# Patient Record
Sex: FEMALE | Race: WHITE | Employment: UNEMPLOYED | URBAN - METROPOLITAN AREA
[De-identification: names, ages, dates, MRNs, and addresses within clinical notes are randomized per-mention and may not be internally consistent; named-entity substitution may affect disease eponyms.]

---

## 2021-11-22 ENCOUNTER — APPOINTMENT (OUTPATIENT)
Dept: CT IMAGING | Age: 86
DRG: 543 | End: 2021-11-22
Payer: MEDICARE

## 2021-11-22 ENCOUNTER — APPOINTMENT (OUTPATIENT)
Dept: GENERAL RADIOLOGY | Age: 86
DRG: 543 | End: 2021-11-22
Payer: MEDICARE

## 2021-11-22 ENCOUNTER — HOSPITAL ENCOUNTER (INPATIENT)
Age: 86
LOS: 4 days | Discharge: NURSING, REHAB OR LONG-TERM CARE | DRG: 543 | End: 2021-11-26
Attending: EMERGENCY MEDICINE | Admitting: INTERNAL MEDICINE
Payer: MEDICARE

## 2021-11-22 DIAGNOSIS — T14.8XXA HEMATOMA: ICD-10-CM

## 2021-11-22 DIAGNOSIS — S32.591A CLOSED FRACTURE OF RAMUS OF RIGHT PUBIS, INITIAL ENCOUNTER (HCC): ICD-10-CM
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
## 2021-11-22 DIAGNOSIS — S32.591A: Primary | ICD-10-CM

## 2021-11-22 DIAGNOSIS — S09.90XA CLOSED HEAD INJURY, INITIAL ENCOUNTER: ICD-10-CM
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
## 2021-11-22 LAB
ANION GAP SERPL CALCULATED.3IONS-SCNC: 11 MMOL/L (ref 3–16)
BASOPHILS ABSOLUTE: 0.1 K/UL (ref 0–0.2)
BASOPHILS RELATIVE PERCENT: 0.6 %
BUN BLDV-MCNC: 29 MG/DL (ref 7–20)
CALCIUM SERPL-MCNC: 9 MG/DL (ref 8.3–10.6)
CHLORIDE BLD-SCNC: 103 MMOL/L (ref 99–110)
CO2: 24 MMOL/L (ref 21–32)
CREAT SERPL-MCNC: 1 MG/DL (ref 0.6–1.2)
EOSINOPHILS ABSOLUTE: 0.1 K/UL (ref 0–0.6)
EOSINOPHILS RELATIVE PERCENT: 0.6 %
GFR AFRICAN AMERICAN: >60
GFR NON-AFRICAN AMERICAN: 51
GLUCOSE BLD-MCNC: 118 MG/DL (ref 70–99)
HCT VFR BLD CALC: 36.3 % (ref 36–48)
HEMOGLOBIN: 11.8 G/DL (ref 12–16)
LYMPHOCYTES ABSOLUTE: 0.8 K/UL (ref 1–5.1)
LYMPHOCYTES RELATIVE PERCENT: 6.4 %
MCH RBC QN AUTO: 31.5 PG (ref 26–34)
MCHC RBC AUTO-ENTMCNC: 32.5 G/DL (ref 31–36)
MCV RBC AUTO: 96.9 FL (ref 80–100)
MONOCYTES ABSOLUTE: 0.7 K/UL (ref 0–1.3)
MONOCYTES RELATIVE PERCENT: 5.5 %
NEUTROPHILS ABSOLUTE: 11.6 K/UL (ref 1.7–7.7)
NEUTROPHILS RELATIVE PERCENT: 86.9 %
PDW BLD-RTO: 13.8 % (ref 12.4–15.4)
PLATELET # BLD: 204 K/UL (ref 135–450)
PMV BLD AUTO: 7.6 FL (ref 5–10.5)
POTASSIUM REFLEX MAGNESIUM: 3.9 MMOL/L (ref 3.5–5.1)
RBC # BLD: 3.74 M/UL (ref 4–5.2)
SARS-COV-2, NAAT: NOT DETECTED
SODIUM BLD-SCNC: 138 MMOL/L (ref 136–145)
WBC # BLD: 13.3 K/UL (ref 4–11)

## 2021-11-22 PROCEDURE — 87635 SARS-COV-2 COVID-19 AMP PRB: CPT

## 2021-11-22 PROCEDURE — 85025 COMPLETE CBC W/AUTO DIFF WBC: CPT

## 2021-11-22 PROCEDURE — 2060000000 HC ICU INTERMEDIATE R&B

## 2021-11-22 PROCEDURE — 70450 CT HEAD/BRAIN W/O DYE: CPT

## 2021-11-22 PROCEDURE — 1200000000 HC SEMI PRIVATE

## 2021-11-22 PROCEDURE — 99283 EMERGENCY DEPT VISIT LOW MDM: CPT

## 2021-11-22 PROCEDURE — 72125 CT NECK SPINE W/O DYE: CPT

## 2021-11-22 PROCEDURE — 72170 X-RAY EXAM OF PELVIS: CPT

## 2021-11-22 PROCEDURE — 80048 BASIC METABOLIC PNL TOTAL CA: CPT

## 2021-11-22 RX ORDER — HYDROCODONE BITARTRATE AND ACETAMINOPHEN 5; 325 MG/1; MG/1
1 TABLET ORAL EVERY 6 HOURS PRN
Status: DISCONTINUED | OUTPATIENT
Start: 2021-11-22 | End: 2021-11-26 | Stop reason: HOSPADM

## 2021-11-22 RX ORDER — SODIUM CHLORIDE 0.9 % (FLUSH) 0.9 %
5-40 SYRINGE (ML) INJECTION EVERY 12 HOURS SCHEDULED
Status: DISCONTINUED | OUTPATIENT
Start: 2021-11-22 | End: 2021-11-26 | Stop reason: HOSPADM

## 2021-11-22 RX ORDER — AMLODIPINE BESYLATE AND BENAZEPRIL HYDROCHLORIDE 5; 40 MG/1; MG/1
1 CAPSULE ORAL DAILY
Status: ON HOLD | COMMUNITY
Start: 2021-09-03 | End: 2021-11-25 | Stop reason: HOSPADM

## 2021-11-22 RX ORDER — SODIUM CHLORIDE 0.9 % (FLUSH) 0.9 %
5-40 SYRINGE (ML) INJECTION PRN
Status: DISCONTINUED | OUTPATIENT
Start: 2021-11-22 | End: 2021-11-26 | Stop reason: HOSPADM

## 2021-11-22 RX ORDER — ACETAMINOPHEN 325 MG/1
650 TABLET ORAL EVERY 6 HOURS PRN
Status: DISCONTINUED | OUTPATIENT
Start: 2021-11-22 | End: 2021-11-26 | Stop reason: HOSPADM

## 2021-11-22 RX ORDER — POLYETHYLENE GLYCOL 3350 17 G/17G
17 POWDER, FOR SOLUTION ORAL DAILY PRN
Status: DISCONTINUED | OUTPATIENT
Start: 2021-11-22 | End: 2021-11-26 | Stop reason: HOSPADM

## 2021-11-22 RX ORDER — ONDANSETRON 2 MG/ML
4 INJECTION INTRAMUSCULAR; INTRAVENOUS EVERY 6 HOURS PRN
Status: DISCONTINUED | OUTPATIENT
Start: 2021-11-22 | End: 2021-11-26 | Stop reason: HOSPADM

## 2021-11-22 RX ORDER — METOPROLOL SUCCINATE 100 MG/1
100 TABLET, EXTENDED RELEASE ORAL DAILY
COMMUNITY
Start: 2021-09-03

## 2021-11-22 RX ORDER — ACETAMINOPHEN 650 MG/1
650 SUPPOSITORY RECTAL EVERY 6 HOURS PRN
Status: DISCONTINUED | OUTPATIENT
Start: 2021-11-22 | End: 2021-11-26 | Stop reason: HOSPADM

## 2021-11-22 RX ORDER — ONDANSETRON 4 MG/1
4 TABLET, ORALLY DISINTEGRATING ORAL EVERY 8 HOURS PRN
Status: DISCONTINUED | OUTPATIENT
Start: 2021-11-22 | End: 2021-11-26 | Stop reason: HOSPADM

## 2021-11-22 RX ORDER — SODIUM CHLORIDE 9 MG/ML
25 INJECTION, SOLUTION INTRAVENOUS PRN
Status: DISCONTINUED | OUTPATIENT
Start: 2021-11-22 | End: 2021-11-26 | Stop reason: HOSPADM

## 2021-11-22 ASSESSMENT — ENCOUNTER SYMPTOMS
ABDOMINAL PAIN: 0
RHINORRHEA: 0
COLOR CHANGE: 0
SHORTNESS OF BREATH: 0
SORE THROAT: 0

## 2021-11-22 ASSESSMENT — PAIN SCALES - WONG BAKER
WONGBAKER_NUMERICALRESPONSE: 2
WONGBAKER_NUMERICALRESPONSE: 2

## 2021-11-22 NOTE — ED PROVIDER NOTES
ROS, all other systems were reviewed and negative. PAST MEDICAL HISTORY   History reviewed. No pertinent past medical history. SURGICAL HISTORY     History reviewed. No pertinent surgical history. CURRENT MEDICATIONS       Previous Medications    AMLODIPINE-BENAZEPRIL (LOTREL) 5-40 MG PER CAPSULE    Take 1 capsule by mouth daily    METOPROLOL SUCCINATE (TOPROL XL) 100 MG EXTENDED RELEASE TABLET    Take 100 mg by mouth daily         ALLERGIES     Patient has no known allergies. FAMILY HISTORY     History reviewed. No pertinent family history. SOCIAL HISTORY       Social History     Socioeconomic History    Marital status:      Spouse name: None    Number of children: None    Years of education: None    Highest education level: None   Occupational History    None   Tobacco Use    Smoking status: None    Smokeless tobacco: None   Substance and Sexual Activity    Alcohol use: None    Drug use: None    Sexual activity: None   Other Topics Concern    None   Social History Narrative    None     Social Determinants of Health     Financial Resource Strain:     Difficulty of Paying Living Expenses: Not on file   Food Insecurity:     Worried About Running Out of Food in the Last Year: Not on file    Lan of Food in the Last Year: Not on file   Transportation Needs:     Lack of Transportation (Medical): Not on file    Lack of Transportation (Non-Medical):  Not on file   Physical Activity:     Days of Exercise per Week: Not on file    Minutes of Exercise per Session: Not on file   Stress:     Feeling of Stress : Not on file   Social Connections:     Frequency of Communication with Friends and Family: Not on file    Frequency of Social Gatherings with Friends and Family: Not on file    Attends Confucianism Services: Not on file    Active Member of Clubs or Organizations: Not on file    Attends Club or Organization Meetings: Not on file    Marital Status: Not on file Intimate Partner Violence:     Fear of Current or Ex-Partner: Not on file    Emotionally Abused: Not on file    Physically Abused: Not on file    Sexually Abused: Not on file   Housing Stability:     Unable to Pay for Housing in the Last Year: Not on file    Number of [place] in the Last Year: Not on file    Unstable Housing in the Last Year: Not on file       SCREENINGS             PHYSICAL EXAM  (up to 7 for level 4, 8 or more for level 5)     ED Triage Vitals [11/22/21 1339]   BP Temp Temp Source Pulse Resp SpO2 Height Weight   (!) 146/55 97.6 °F (36.4 °C) Oral 86 15 96 % 5' 7\" (1.702 m) --       Physical Exam  Constitutional:       Appearance: She is well-developed. HENT:      Head: Normocephalic. Right Ear: Tympanic membrane normal.      Left Ear: Tympanic membrane normal.   Cardiovascular:      Rate and Rhythm: Normal rate. Pulmonary:      Effort: Pulmonary effort is normal. No respiratory distress. Abdominal:      General: There is no distension. Palpations: Abdomen is soft. Tenderness: There is no abdominal tenderness. Musculoskeletal:         General: Normal range of motion. Cervical back: Normal range of motion. No spinous process tenderness or muscular tenderness. Skin:     General: Skin is warm and dry. Neurological:      Mental Status: She is alert and oriented to person, place, and time.          DIAGNOSTIC RESULTS   LABS:    Labs Reviewed   CBC WITH AUTO DIFFERENTIAL - Abnormal; Notable for the following components:       Result Value    WBC 13.3 (*)     RBC 3.74 (*)     Hemoglobin 11.8 (*)     Neutrophils Absolute 11.6 (*)     Lymphocytes Absolute 0.8 (*)     All other components within normal limits    Narrative:     Performed at:  [facility] ([facility]) - [facility] [address],  [place], [facility]   Phone ([phone], RAPID   BASIC METABOLIC PANEL W/ REFLEX TO MG FOR LOW K       All other labs werewithin normal range or not returned as of this dictation. EKG: All EKG's are interpreted by the Emergency Department Physician who either signs or Co-signs this chart in the absence of a cardiologist.  Please see their note for interpretation of EKG. RADIOLOGY:     CT of the head interpreted by radiologist for   Impression:    No acute intracranial abnormality. Mild generalized cerebral atrophy and mild chronic small vessel white matter   ischemic changes. CT of the cervical spine interpreted by radiologist for   Impression:    No acute traumatic injury of the cervical spine. Multilevel disc and facet degenerative changes. Pelvic x-ray interpreted by radiologist for  Impression:    Head mildly displaced fracture through the right pubic symphysis and right   inferior pubic ramus.  No definite hip fracture. Interpretation per the Radiologist below, if available at the time of this note:    CT CERVICAL SPINE WO CONTRAST   Preliminary Result   No acute traumatic injury of the cervical spine. Multilevel disc and facet degenerative changes. CT HEAD WO CONTRAST   Final Result   No acute intracranial abnormality. Mild generalized cerebral atrophy and mild chronic small vessel white matter   ischemic changes. XR PELVIS (1-2 VIEWS)   Final Result   Head mildly displaced fracture through the right pubic symphysis and right   inferior pubic ramus. No definite hip fracture. No results found.       PROCEDURES   Unless otherwise noted below, none     Procedures     CRITICAL CARE TIME   N/A    CONSULTS:  IP CONSULT TO ORTHOPEDIC SURGERY  IP CONSULT TO HOSPITALIST  IP CONSULT TO CASE MANAGEMENT  IP CONSULT TO ORTHOPEDIC SURGERY      EMERGENCYDEPARTMENT COURSE and DIFFERENTIAL DIAGNOSIS/MDM:   Vitals:    Vitals:    11/22/21 1339   BP: (!) 146/55   Pulse: 86   Resp: 15   Temp: 97.6 °F (36.4 °C)   TempSrc: Oral   SpO2: 96%   Height: 5' 7\" (1.702 m)       Patient was given the following medications:  Medications - No data to display  Patient was seen and evaluated by myself. Patient here today for mechanical fall. Daughter reports that they just got back from breakfast when the patient lost her balance. Daughter states that she fell striking the right side of her head. There was no loss of consciousness and she does not use any blood thinners. Patient denies any dizziness or lightheadedness. On exam she is awake and alert hemodynamically stable nontoxic in appearance. She does have large hematoma noted to her right forehead. Patient denies any neck chest abdomen or extremity pain. Daughter reports that she was complaining of her right hip pain however at this is not reproducible at this time. Head CT cervical spine CT in hip x-rays were interpreted. Pelvic x-ray was concerning for mildly displaced fracture through the right pubic symphysis and the right inferior pubic ramus. Consult was placed to the orthopedist.  Eleonora Perez states the patient can be discharged from an orthopedic standpoint however the patient was having difficulty standing up and ambulating in the ED therefore should be admitted to medicine. Consult was placed to the hospitalist for admission. Hospitalist is accepted. Patient's care is transferred to the inpatient unit. The patient tolerated their visit well. I have evaluated this patient. My supervising physician was available for consultation. The patient and / or the family were informed of the results of any tests, a time was given to answer questions, a plan was proposed and they agreed with plan. FINAL IMPRESSION      1. Closed fracture dislocation of right side of symphysis pubis, initial encounter ([facility] [address]    2. Fall, initial encounter    3. Closed head injury, initial encounter    4. Closed fracture of ramus of right pubis, initial encounter ([address]    5.  Hematoma          DISPOSITION/PLAN   DISPOSITION Admitted 11/22/2021 04:18:55 PM      PATIENT REFERRED TO:  No follow-up provider specified.     DISCHARGE MEDICATIONS:  New Prescriptions    No medications on file       DISCONTINUED MEDICATIONS:  Discontinued Medications    No medications on file              (Please note that portions of this note were completed with a voice recognition program.  Efforts were made to edit the dictations but occasionally words are mis-transcribed.)    JANET Foss CNP (electronically signed)         JANET Foss CNP  11/22/21 3651

## 2021-11-22 NOTE — ED PROVIDER NOTES
ED Attending Attestation Note    This patient was seen by the advanced practice provider. I have seen and examined the patient. I agree with the workup, evaluation, management, and diagnosis. The care plan has been discussed. 80 y.o. female presents for evaluation after a fall. Stumbled over her feet. Struck head. Able to stand thereafter, but unable to ambulate. Focused exam:   Gen: 80 y.o. female, NAD  HEENT: Contusion right temple. PERRL. EOMI. CV: RRR w/ +murmur  Lungs: CTAB. No incr WOB. Abdomen: Soft, nontender, nondistended. No rebound/guarding. MSK: Right pelvis stable but tender to anterior compression    CT CERVICAL SPINE WO CONTRAST   Preliminary Result   No acute traumatic injury of the cervical spine. Multilevel disc and facet degenerative changes. CT HEAD WO CONTRAST   Final Result   No acute intracranial abnormality. Mild generalized cerebral atrophy and mild chronic small vessel white matter   ischemic changes. XR PELVIS (1-2 VIEWS)   Final Result   Head mildly displaced fracture through the right pubic symphysis and right   inferior pubic ramus. No definite hip fracture. For further details of the patient's emergency department visit, please see the NITA's documentation. Leonardo Linda MD     This report has been produced using speech recognition software and may contain errors related to that system including errors in grammar, punctuation, and spelling, as well as words and phrases that may be inappropriate. If there are any questions or concerns please feel free to contact the dictating provider for clarification.        Leonardo Linda MD  11/22/21 4903

## 2021-11-23 ENCOUNTER — APPOINTMENT (OUTPATIENT)
Dept: CT IMAGING | Age: 86
DRG: 543 | End: 2021-11-23
Payer: MEDICARE

## 2021-11-23 LAB
ANION GAP SERPL CALCULATED.3IONS-SCNC: 14 MMOL/L (ref 3–16)
BASOPHILS ABSOLUTE: 0 K/UL (ref 0–0.2)
BASOPHILS RELATIVE PERCENT: 0.3 %
BUN BLDV-MCNC: 21 MG/DL (ref 7–20)
CALCIUM SERPL-MCNC: 9.1 MG/DL (ref 8.3–10.6)
CHLORIDE BLD-SCNC: 99 MMOL/L (ref 99–110)
CO2: 20 MMOL/L (ref 21–32)
CREAT SERPL-MCNC: 0.7 MG/DL (ref 0.6–1.2)
EOSINOPHILS ABSOLUTE: 0 K/UL (ref 0–0.6)
EOSINOPHILS RELATIVE PERCENT: 0.1 %
GFR AFRICAN AMERICAN: >60
GFR NON-AFRICAN AMERICAN: >60
GLUCOSE BLD-MCNC: 132 MG/DL (ref 70–99)
GLUCOSE BLD-MCNC: 134 MG/DL (ref 70–99)
HCT VFR BLD CALC: 36.9 % (ref 36–48)
HEMOGLOBIN: 12 G/DL (ref 12–16)
LYMPHOCYTES ABSOLUTE: 1.1 K/UL (ref 1–5.1)
LYMPHOCYTES RELATIVE PERCENT: 10 %
MAGNESIUM: 2.2 MG/DL (ref 1.8–2.4)
MCH RBC QN AUTO: 31.9 PG (ref 26–34)
MCHC RBC AUTO-ENTMCNC: 32.6 G/DL (ref 31–36)
MCV RBC AUTO: 97.7 FL (ref 80–100)
MONOCYTES ABSOLUTE: 0.7 K/UL (ref 0–1.3)
MONOCYTES RELATIVE PERCENT: 6.6 %
NEUTROPHILS ABSOLUTE: 8.7 K/UL (ref 1.7–7.7)
NEUTROPHILS RELATIVE PERCENT: 83 %
PDW BLD-RTO: 13.6 % (ref 12.4–15.4)
PERFORMED ON: ABNORMAL
PLATELET # BLD: 181 K/UL (ref 135–450)
PMV BLD AUTO: 8.9 FL (ref 5–10.5)
POTASSIUM REFLEX MAGNESIUM: 3.5 MMOL/L (ref 3.5–5.1)
RBC # BLD: 3.78 M/UL (ref 4–5.2)
SODIUM BLD-SCNC: 133 MMOL/L (ref 136–145)
WBC # BLD: 10.5 K/UL (ref 4–11)

## 2021-11-23 PROCEDURE — 6370000000 HC RX 637 (ALT 250 FOR IP): Performed by: PHYSICIAN ASSISTANT

## 2021-11-23 PROCEDURE — 99221 1ST HOSP IP/OBS SF/LOW 40: CPT | Performed by: PHYSICIAN ASSISTANT

## 2021-11-23 PROCEDURE — 83735 ASSAY OF MAGNESIUM: CPT

## 2021-11-23 PROCEDURE — 97530 THERAPEUTIC ACTIVITIES: CPT

## 2021-11-23 PROCEDURE — 99222 1ST HOSP IP/OBS MODERATE 55: CPT | Performed by: PHYSICIAN ASSISTANT

## 2021-11-23 PROCEDURE — 97161 PT EVAL LOW COMPLEX 20 MIN: CPT

## 2021-11-23 PROCEDURE — 80048 BASIC METABOLIC PNL TOTAL CA: CPT

## 2021-11-23 PROCEDURE — 85025 COMPLETE CBC W/AUTO DIFF WBC: CPT

## 2021-11-23 PROCEDURE — 97112 NEUROMUSCULAR REEDUCATION: CPT

## 2021-11-23 PROCEDURE — 72192 CT PELVIS W/O DYE: CPT

## 2021-11-23 PROCEDURE — 97165 OT EVAL LOW COMPLEX 30 MIN: CPT

## 2021-11-23 PROCEDURE — 2580000003 HC RX 258: Performed by: NURSE PRACTITIONER

## 2021-11-23 PROCEDURE — 1200000000 HC SEMI PRIVATE

## 2021-11-23 PROCEDURE — 36415 COLL VENOUS BLD VENIPUNCTURE: CPT

## 2021-11-23 PROCEDURE — 6360000002 HC RX W HCPCS: Performed by: NURSE PRACTITIONER

## 2021-11-23 RX ORDER — LISINOPRIL 5 MG/1
5 TABLET ORAL DAILY
Status: DISCONTINUED | OUTPATIENT
Start: 2021-11-23 | End: 2021-11-24

## 2021-11-23 RX ORDER — METOPROLOL SUCCINATE 50 MG/1
50 TABLET, EXTENDED RELEASE ORAL DAILY
Status: DISCONTINUED | OUTPATIENT
Start: 2021-11-23 | End: 2021-11-24

## 2021-11-23 RX ADMIN — SODIUM CHLORIDE, PRESERVATIVE FREE 10 ML: 5 INJECTION INTRAVENOUS at 20:11

## 2021-11-23 RX ADMIN — METOPROLOL SUCCINATE 50 MG: 50 TABLET, EXTENDED RELEASE ORAL at 13:59

## 2021-11-23 RX ADMIN — SODIUM CHLORIDE, PRESERVATIVE FREE 10 ML: 5 INJECTION INTRAVENOUS at 08:53

## 2021-11-23 RX ADMIN — ENOXAPARIN SODIUM 40 MG: 100 INJECTION SUBCUTANEOUS at 00:02

## 2021-11-23 RX ADMIN — LISINOPRIL 5 MG: 5 TABLET ORAL at 13:59

## 2021-11-23 ASSESSMENT — PAIN SCALES - WONG BAKER
WONGBAKER_NUMERICALRESPONSE: 2

## 2021-11-23 NOTE — PLAN OF CARE
Admit 2W    Fall at home  Pelvis X-ray noted with displaced fx through the right pubic symphysis and right inferior pubic ramus  Ortho consulted in ED, ok for weight bearing as tolerated at PT/OT consult  Pt might need SNF  Pain control    Home medication not verified at time of admit, nursing to verify    Mirtha Fan, APRN - CNP

## 2021-11-23 NOTE — FLOWSHEET NOTE
11/23/21 0155   Vital Signs   Temp 97.8 °F (36.6 °C)   Temp Source Oral   Pulse 84   Heart Rate Source Monitor   Resp 18   BP (!) 157/72   BP Location Left upper arm   Patient Position Semi fowlers   Level of Consciousness Alert (0)   MEWS Score 1   Patient Currently in Pain Denies   Oxygen Therapy   SpO2 93 %   Pulse Oximeter Device Mode Intermittent   Pulse Oximeter Device Location Finger   O2 Device None (Room air)     Pt resting in bed with eyes closed. Pt alert and calm. Denies pain at this time. Snack and drink refused. Bed locked in lowest position. Bed alarm in place. Call light and bedside table within reach. Will continue to monitor.

## 2021-11-23 NOTE — PROGRESS NOTES
Spoke with pts daughter regarding pts condition and health history. Daughter would like update when patient is moved to an inpatient unit.

## 2021-11-23 NOTE — PROGRESS NOTES
Patient admitted to room Bed 6 Overflow from ER. Patient oriented to room, call light, bed rails, phone, lights and bathroom. Patient instructed about the schedule of the day including: vital sign frequency, lab draws, possible tests, frequency of MD and staff rounds, daily weights, I &O's and prescribed diet. Yes, bed alarm in place, patient aware of placement and reason. No, Telemetry box in place, patient aware of placement and reason. Bed locked, in lowest position, side rails up 2/4, call light within reach. Recliner Assessment  Patient is not able to demonstrated the ability to move from a reclining position to an upright position within the recliner. Patient is confused, demented and /or unable to follow instruction. 4 Eyes Skin Assessment     The patient is being assess for   Admission    I agree that 2 RN's have performed a thorough Head to Toe Skin Assessment on the patient. ALL assessment sites listed below have been assessed. Areas assessed for pressure by both nurses:   [x]   Head, Face, and Ears   [x]   Shoulders, Back, and Chest, Abdomen  [x]   Arms, Elbows, and Hands   [x]   Coccyx, Sacrum, and Ischium  [x]   Legs, Feet, and Heels    Large hematoma to right forehead, Bruising to right hip, scattered bruising. Skin Assessed Under all Medical Devices by both nurses:  Room Air              All Mepilex Borders were peeled back and area peeked at by both nurses:  No: NA  Please list where Mepilex Borders are located:               **SHARE this note so that the co-signing nurse is able to place an eSignature**    Co-signer eSignature: Electronically signed by Cherrie Huffman RN on 11/23/21 at 12:41 AM EST    Does the Patient have Skin Breakdown related to pressure?   No              Phuc Prevention initiated:  Yes   Wound Care Orders initiated:  NA      [facility] nurse consulted for Pressure Injury (Stage 3,4, Unstageable, DTI, NWPT, Complex wounds)and New or Established Ostomies:  NA Primary Nurse eSignature: Electronically signed by Aleida Russell RN on 11/22/21 at 11:39 PM EST

## 2021-11-23 NOTE — CARE COORDINATION
Case Management Assessment  Initial Evaluation      Patient Name: Thad Win  YOB: 1925  Diagnosis: Hematoma [T14. 8XXA]  Closed head injury, initial encounter [D03.19AH]  Fall, initial encounter [W19. XXXA]  Fall at home, initial encounter [T70. XXXA, L42.078]  Closed fracture of ramus of right pubis, initial encounter ([facility] [address] [S32.591A]  Closed fracture dislocation of right side of symphysis pubis, initial encounter ([facility] [address] Pola Hoff  Date / Time: 11/22/2021  1:08 PM    Admission status/Date: IP 11/22/2021  Chart Reviewed: Yes      Patient Interviewed: No   Family Interviewed:  Yes - daughterMarina       Hospitalization in the last 30 days:  No      Health Care Decision Maker :   Primary Decision Maker: Lalo Gonzalez [facility] - [phone]    (CM - must 1st enter selection under Navigator - emergency contact- [place] Relationship and pick relationship)   Who do you trust or have selected to make healthcare decisions for you    Current PCP: Henry Pavon, [address] [place], [place] [address]Al[place]a Tiline required for SNF : NO         3 night stay required - WAIVED    ADLS  Support Systems/Care Needs:    Transportation: family    Meal Preparation: family    Housing  Living Arrangements: Lives in [facility] w/ dtr  Steps: 8 steps into the house but none once pt is in she can remain on the main floor with access to bed and bath and living area.   Intent for return to present living arrangements: Yes  Identified Issues: NA    Home Care Information  Active with [address] : No Agency:(Services)     Passport/Waiver : No  :                      Phone Number:    Passport/Waiver Services: NA          Durable Medical Equiptment   DME Provider: [facility]  Equipment:   Walker___Cane___RTS___ BSC___Shower Chair___Hospital Bed___W/C____Other________  02 at ____Liter(s)---wears(frequency)_______ [facility] ___ CPAP___ BiPap___   N/A____      Home O2 Use :  No      Community Service Affiliation  Dialysis:  No    · Agency:  · Location:  · Dialysis Schedule:  · Phone:   · Fax: Other Community Services: (ex:PT/OT,Mental Health,Wound Clinic, Cardio/Pul [address])    DISCHARGE PLAN: Explained Case Management role/services. Chart reviewed. Met with pt's daughter, caregiver via phone and explained the role of the CM. Plan: TBD. SNF and [facility] leists emailed to daughter at [email or web address]. Await PT/OT eval and recs.  +CM following

## 2021-11-23 NOTE — PROGRESS NOTES
Physical Therapy  Inpatient Physical Therapy Evaluation and Treatment    Unit: SD  Date:  11/23/2021  Patient Name:    Fannie Holguin  Admitting diagnosis:  Hematoma [T14. 8XXA]  Closed head injury, initial encounter [V40.49JU]  Fall, initial encounter [W19. XXXA]  Fall at home, initial encounter [G07. XXXA, Y92.009]  [facility] right pubis[facility] [S32.591A]  Closed fracture dislocation of right side of symphysis pubis, initial encounter [facility]) Lulu Montgomery  Admit Date:  11/22/2021  Precautions/Restrictions/WB Status/ Lines/ Wounds/ Oxygen: Fall risk, Bed/chair alarm, Lines -IV, Confusion, [place] (hard of hearing) and WB Restrictions (WBAT)    Treatment Time:  3603-2688  Treatment Number:  1   Timed Code Treatment Minutes: 31 minutes  Total Treatment Minutes:  41  minutes    Patient Goals for Therapy: \" to go home \"          Discharge Recommendations: SNF  DME needs for discharge: defer to facility       Therapy recommendation for EMS Transport: requires transport by cot due to difficulty with transfers    Therapy recommendations for staff:   Assist of 1 with use of No AD for all bed mobility and stand pivot transfers with increased time to complete    History of Present Illness: Per chart review \"89 y. o. female for closed head injury. Onset was today.  Context includes pt arrived home with family after having breakfast. Daughter reports she tripped over her own feet. Pt denies any dizziness or light headedness. Pt denies any loc or use of blood thinners. Alleviating factors include nothing.  Aggravating factors include nothing.  Pain is 0/10. nothing has been used for pain today  Pelvis X-ray noted with displaced fx through the right pubic symphysis and right inferior pubic ramus  Ortho consulted in ED, ok for weight bearing as tolerated\"    Home Health S4 Level Recommendation:  NA  AM-PAC Mobility Score    AM-PAC Inpatient Mobility Raw Score : 13       Preadmission Environment    Social history provided by daughter, Felipa Starr via phone call. Daughter states family suspects dementia, but pt does not have formal diagnosis   Pt. Lives with family (daughter) Tania Eaton environment:  two story home (pt bed/bath on second floor, but daughter states there is also a bed/bath on first floor the patient can use)  Steps to enter first floor: 4 steps to enter; curb style steps  Steps to second floor: Partial flight 8 steps to second floor  Bathroom: walk in shower and standard height commode  Equipment owned: none    Preadmission Status:  Pt. Able to drive: No  Pt Fully independent with ADLs: Yes  Pt. Required assistance from family for: Cleaning, Cooking and Laundry   Pt. independent for transfers and gait and walked with No Device  History of falls Yes     Pain   Yes  Location: RLE  Rating: moderate /10  Pain Medicine Status: RN notified    Cognition    A&O x0 (Pt unable to answer orientation questions, states birthday as [birth year] after repeating questions multiple times) Per RN note from earlier this morning, pt more confused now. RN aware  Able to follow 1 step commands inconsistently    Subjective  Patient lying supine in bed with no family present. Pt agreeable to this PT eval & tx. Upper Extremity ROM/Strength  Please see OT evaluation. Lower Extremity ROM / Strength   AROM WFL: No  ROM limitations: RLE    BLE strength impaired, but not formally assessed with MMT. due to impaired command following and pain    Lower Extremity Sensation    NT    Lower Extremity Proprioception:   NT    Coordination and Tone  NT    Balance  Sitting:  Fair +; SBA  Comments: sitting EOB    Standing: Fair ; Min A   Comments: with BUE support on RW for static standing balance, heavy reliance on BUE and minimal weight bearing on RLE    Bed Mobility   Supine to Sit:    Mod A  at BLE and trunk  Sit to Supine:   Max A  and 2 persons at trunk and BLE  Rolling:   Not Tested  Scooting in sitting: Not Tested  Scooting in supine:  Max A and Total A    Transfer Training     Sit to stand:   Min A   Stand to sit:   Min A   Bed to Chair:   Mod A  with use of gait belt and rolling walker (RW)   Comment: Pt demo difficulty with weight bearing on RLE due to pain, mod(A) for weight shifting to improve transfer    Gait gait completed as indicated below  Distance:      1 ft forward, 1 ft backward  Deviations (firm surface/linoleum):  decreased franko, narrow LOIS, antalgic pattern, decreased step length on left and decreased stance time on right  Assistive Device Used:    gait belt and rolling walker (RW)  Level of Assist:    Mod A   Comment: Pt unable to take sufficient step forward with LLE due to pain with weight bearing on RLE. Pt pivots B feet to side (unable to effectively lift foot off ground)    Stair Training deferred, pt unsafe/ not appropriate to complete stairs at this time    Activity Tolerance   Pt completed therapy session with Pain noted with movement of RLE    Positioning Needs   Pt in bed, alarm set, positioned in proper neutral alignment and pressure relief provided. Call light provided and all needs within reach    Exercises Initiated  Gisela deferred secondary to treatment focus on functional mobility  NA    Patient/Family Education   Pt educated on role of inpatient PT, POC, importance of continued activity, DC recommendations, safety awareness, transfer techniques and calling for assist with mobility. Assessment  Pt seen for Physical Therapy evaluation in acute care setting. Pt demonstrated decreased Activity tolerance, Balance, ROM, Safety and Strength as well as decreased independence with Ambulation, Bed Mobility  and Transfers.      Recommending SNF upon discharge as patient functioning well below baseline, demonstrates good rehab potential and unable to return home due to inability to negotiate stairs to enter home/bedroom/bathroom, burden of care beyond caregiver ability, home environment not conducive to patient recovery and limited safety awareness. Goals : To be met in 3 visits:  1). Independent with LE Ex x 10 reps    To be met in 6 visits:  1). Supine to/from sit: Supervision  2). Sit to/from stand: Supervision  3). Bed to chair: Supervision  4). Gait: Ambulate 50 ft.  with  Supervision and use of rolling walker (RW)  5). Tolerate B LE exercises 3 sets of 10-15 reps  6). Ascend/descend 4 steps with Supervision with use of N/A and rolling walker (RW)    Rehabilitation Potential: Fair  Strengths for achieving goals include:   PLOF, Family Support and Pt cooperative   Barriers to achieving goals include:    Pain    Plan    To be seen 3-5 x / week  while in acute care setting for therapeutic exercises, bed mobility, transfers, progressive gait training, balance training, and family/patient education. Signature: Diego Westbrook, PT, DPT      If patient discharges from this facility prior to next visit, this note will serve as the Discharge Summary.

## 2021-11-23 NOTE — H&P
Hospital Medicine History & Physical      PCP: Jeanmarie Sommer, APRN - CNP    Date of Admission: 11/22/2021    Date of Service: Pt seen/examined on 11/23/2021     Chief Complaint:    Chief Complaint   Patient presents with   [address] Head Injury     Patient unsure if she had LOC prior to fall, does not remember event. Large hematoma to the right side of forehead     History Of Present Illness: The patient is a 80 y.o. female with chronic systolic congestive heart failure, nonischemic cardiomyopathy, aortic valve stenosis, pacemaker in place, dementia, hypertension who presented to [facility] ED with complaint of fall at home. Patient cannot provide a history due to suspected underlying dementia. History is obtained from ER notes. Patient's daughter stated that yesterday while walking into the home the patient tripped over her own feet, she landed and hit the right side of her face on the ground. The patient denied any dizziness or lightheadedness. She does not use any blood thinners. She was brought to the ER for evaluation where she was found to have significant bruising to the right side of her face. Imaging obtained showed a right pubic ramus fracture/lateral sacral fracture/small extraperitoneal pelvic hematoma and a small right pelvic sidewall hematoma. She is admitted for further care and evaluation    Past Medical History:    History reviewed. No pertinent past medical history. Past Surgical History:        Procedure Laterality Date    HYSTERECTOMY         Medications Prior to Admission:    Prior to Admission medications    Medication Sig Start Date End Date Taking? Authorizing Provider   metoprolol succinate (TOPROL XL) 100 MG extended release tablet Take 100 mg by mouth daily 9/3/21  Yes Historical Provider, MD   amLODIPine-benazepril (LOTREL) 5-40 MG per capsule Take 1 capsule by mouth daily 9/3/21  Yes Historical Provider, MD       Allergies:  Patient has no known allergies.     Social History:  The patient currently lives at home with dtr     TOBACCO:   reports that she has never smoked. She does not have any smokeless tobacco history on file. ETOH:   has no history on file for alcohol use. Family History:   Positive as follows:    History reviewed. No pertinent family history. REVIEW OF SYSTEMS:     Cannot obtain 2/2 dementia     PHYSICAL EXAM:    /66   Pulse 68   Temp 97.5 °F (36.4 °C)   Resp 18   Ht 5' 7\" (1.702 m)   Wt 119 lb 0.8 oz (54 kg)   SpO2 98%   BMI 18.65 kg/m²     Gen: No distress. Alert. Bruising to right side of face   Eyes: PERRL. No sclera icterus. No conjunctival injection. ENT: No discharge. Pharynx clear. Neck: No JVD. Trachea midline. Resp: No accessory muscle use. No crackles. No wheezes. No rhonchi. CV: Regular rate. Regular rhythm. 3/6 systolic murmur. No rub. No edema. Capillary Refill: Brisk,< 3 seconds   Peripheral Pulses: +2 palpable, equal bilaterally   GI: Non-tender. Non-distended. No masses. No organomegaly. Normal bowel sounds. No hernia. Skin: Warm and dry. No nodule on exposed extremities. No rash on exposed extremities. M/S: No cyanosis. No joint deformity. No clubbing. Neuro: Awake. Grossly nonfocal    Psych: Oriented to self only. No anxiety or agitation. CBC:   Recent Labs     11/22/21  1720 11/23/21  0709   WBC 13.3* 10.5   HGB 11.8* 12.0   HCT 36.3 36.9   MCV 96.9 97.7    181     BMP:   Recent Labs     11/22/21  1720 11/23/21  0708    133*   K 3.9 3.5    99   CO2 24 20*   BUN 29* 21*   CREATININE 1.0 0.7     CULTURES  COVID 19, NAAT: not detected     EKG:  I have reviewed the EKG with the following interpretation:   None     RADIOLOGY  CT PELVIS WO CONTRAST Additional Contrast? None   Preliminary Result   1. Acute right parasymphyseal fracture extending into the superior pubic   ramus. 2. Acute mildly overriding right inferior pubic ramus fracture.    3. Acute nondisplaced right anterior and lateral sacral fracture. 4. Osteopenia. 5. Small extraperitoneal pelvic hematoma. Small right pelvic sidewall   hematoma. CT CERVICAL SPINE WO CONTRAST   Preliminary Result   No acute traumatic injury of the cervical spine. Multilevel disc and facet degenerative changes. CT HEAD WO CONTRAST   Final Result   No acute intracranial abnormality. Mild generalized cerebral atrophy and mild chronic small vessel white matter   ischemic changes. XR PELVIS (1-2 VIEWS)   Final Result   Head mildly displaced fracture through the right pubic symphysis and right   inferior pubic ramus. No definite hip fracture. ASSESSMENT/PLAN:    #Mechanical fall  - dtr reports patient tripped while walking into home, no LOC  - PT OT eval- will likely need SNF     #Closed head injury   - CT head no intracranial abnormality  - monitor    #Pelvic fracture  #Pelvic hematoma   - seen by ortho surg- no surg plans  - PT OT  - pain control- prn acetaminophen and norco     #Chronic systolic CHF  #Nonischemic CMP  #Aortic stenosis  - no evidence of acute CHF    #Pacemaker in place   - per chart review    #HTN  - BP is stable this AM  - will cont home meds at decreased doses:  - metoprolol XL 50 mg daily (instead of 100 mg daily)  - Lisinopril 5 mg daily (instead of benazepril 40 mg daily)  - hold Norvasc  - increase BP meds as BP indicates    #Dementia   - at baseline     DVT Prophylaxis: SCD  - Hold lovenox with pelvic hematoma and facial hematoma     Diet: ADULT DIET;  Regular  Code Status: Full Code     Dispo: MATEO planning, CM following for SNF placement     Laura Ramos PA-C  11/23/2021 12:18 PM

## 2021-11-23 NOTE — PROGRESS NOTES
Pt is lying in bed with their eyes open. Alert and oriented times 4. Speech is clear. Denies pain and any needs at this time. Call light within reach. Bed in lowest position with the wheels locked.   Clive Sullivan RN

## 2021-11-23 NOTE — PROGRESS NOTES
Inpatient Occupational Therapy  Evaluation and Treatment    Unit: Med/surg overflow   Date:  11/23/2021  Patient Name:    Romero Acevedo  Admitting diagnosis:  Hematoma [T14. 8XXA]  Closed head injury, initial encounter [T92.10PN]  Fall, initial encounter [W19. XXXA]  Fall at home, initial encounter [Y40. XXXA, Y92.009]  Closed fracture of ramus of right pubis, initial encounter ([facility] [address] [S32.591A]  Closed fracture dislocation of right side of symphysis pubis, initial encounter ([facility] [address] Creed Kaiden  Admit Date:  11/22/2021  Precautions/Restrictions/WB Status/ Lines/ Wounds/ Oxygen: fall risk, bed/chair alarm and confusion    Treatment Time:  10:24-11:05  Treatment Number: 1     Billable Treatment Time: 31minutes   Total Treatment Time:   41   minutes    Patient Goals for Therapy:  None stated       Discharge Recommendations: SNF  DME needs for discharge: defer to facility       Therapy recommendations for staff:   Assist of 1 with use of rolling walker (RW) for all transfers to/from BSC/chair    History of Present Illness: 80 y.o. female for closed head injury. Onset was today. Context includes pt arrived home with family after having breakfast. Daughter reports she tripped over her own feet. Pt denies any dizziness or light headedness. Pt denies any loc or use of blood thinners. Alleviating factors include nothing. Aggravating factors include nothing. Pain is 0/10. nothing has been used for pain today    Pelvis X-ray noted with displaced fx through the right pubic symphysis and right inferior pubic ramus  Ortho consulted in ED, ok for weight bearing as tolerated    Upon entering room pt was alert but was unable to answer questions such as full name, year she was born or where she was. Nurse stated she was oriented x4 earlier and when nurse asked orientation questions again pt was unable to answer all questions.   There was no PMH in chart so called daughter to ask all environment and PLOF and daughter stated she does suspect dementia but she is not dx. Home Health S4 Level Recommendation:  NA  AM-PAC Score:      Preadmission Environment    Pt. Lives with family (daughter) Andrea Mathews environment:    two story home (pt bed/bath on second floor, but daughter states there is also a bed/bath on first floor the patient can use   Steps to enter first floor: 4 steps to enter; curb style steps  Steps to second floor: Partial flight 8 steps to second floor  Bathroom: walk in shower and standard height commode  Equipment owned: none     Preadmission Status:  Pt. Able to drive: No  Pt Fully independent with ADLs: Yes  Pt. Required assistance from family for: Cleaning, Cooking and Laundry   Pt. independent for transfers and gait and walked with No Device  History of falls Yes     Pain  No at rest, pain with movement noted   Rating:NA mild pain with movement   Location:NA   Pain Medicine Status: No request made      Cognition    A&O Person but could not state last name, new date and month of birth date but not year. Able to follow 1 step commands inconsistently    Subjective  Patient lying supine in bed with no family present - no visitors present due to COVID-19 restrictions  Pt agreeable to this OT eval & tx. Upper Extremity ROM:    WFL,  pt able to perform all bed mobility, transfers, and gait without ROM limitation.    R UE limited shoulder AROM to 90* but WFL AAROM     Upper Extremity Strength:    BUE strength WFL, but not formally assessed w/ MMT    Upper Extremity Sensation    WFL    Upper Extremity Proprioception:  WFL    Coordination and Tone  WFL    Balance  Functional Sitting Balance:  WFL  Functional Standing Balance:WFL    Bed mobility:    Supine to sit:   Mod A  Sit to supine:   Max A  Rolling:    N/A  Scooting in sitting:  N/A  Scooting to head of bed:   Total A     Bridging:   N/A    Transfers:    Sit to stand:  Min A  Stand to sit:  Min A  Bed to chair:   Mod A and with use of RW increased time and assistance with weight shifting due to pain pt did not want to take steps, would pivot foot   Standard toilet: N/A  Bed to BSC:  N/A    Dressing:      UE:   N/A  LE:    Total A don socks     Bathing:    UE:  N/A  LE:  N/A    Eating:   Supervision    Toileting:  N/A    Activity Tolerance   Pt completed therapy session with Pain noted with movement  SpO2: 98% on room air at rest   HR: 84 at rest     Positioning Needs: In bed, call light and needs in reach. Exercise / Activities Initiated:   N/A    Patient/Family Education:   Role of OT  Safe RW use/hand placement    Assessment of Deficits: Pt seen for Occupational therapy evaluation in acute care setting. Pt demonstrated decreased Activity tolerance, ADLs, Balance , Bathing, Bed mobility, Dressing, Safety Awareness, Strength and Transfers. Pt functioning below baseline and will likely benefit from skilled occupational therapy services to maximize safety and independence. Goal(s) : To be met in 3 Visits:  1). Bed to toilet/BSC: CGA    To be met in 5 Visits:  1). Supine to/from Sit:  CGA  4). Upper Body Dressing:  SBA  5). Lower Body Dressing:  Min A  6). Pt to demonstrate UE exs x 15 reps with minimal cues    Rehabilitation Potential:  Fair for goals listed above. Strengths for achieving goals include: Pt motivated, Family Support and Pt cooperative  Barriers to achieving goals include:  Pain and Cognition     Plan: To be seen 3-5 x/wk while in acute care setting for therapeutic exercises, bed mobility, transfers, dressing, bathing, family/patient education, ADL/IADL retraining, energy conservation training.      Enrique Herman OTR/L #[id number]            If patient discharges from this facility prior to next visit, this note will serve as the Discharge Summary

## 2021-11-23 NOTE — CONSULTS
Department of Orthopedic Surgery  Physician Assistant   Consult Note        Reason for Consult:  Pelvic fracture  Requesting Physician: Brandon Kaur*  Date of Service: 11/23/2021 12:21 PM    CHIEF COMPLAINT:  As Above    History Obtained From:  patient, electronic medical record    HISTORY OF PRESENT ILLNESS:                The patient is a 80 y.o. female who presents with above chief complaint. Pt sustained a fall landing on her bottom. She is typically ambulatory and has lived independent prior to this admission according to daughter. She is baseline confused. Doesn't offer much more history. No n/t in the legs. No current pain she states. Past Medical History:    History reviewed. No pertinent past medical history. Past Surgical History:        Procedure Laterality Date    HYSTERECTOMY           Medications Prior to Admission:   Prior to Admission medications    Medication Sig Start Date End Date Taking? Authorizing Provider   metoprolol succinate (TOPROL XL) 100 MG extended release tablet Take 100 mg by mouth daily 9/3/21  Yes Historical Provider, MD   amLODIPine-benazepril (LOTREL) 5-40 MG per capsule Take 1 capsule by mouth daily 9/3/21  Yes Historical Provider, MD       Allergies:  Patient has no known allergies. Social History:    Tobacco:  reports that she has never smoked. She does not have any smokeless tobacco history on file. Alcohol:  has no history on file for alcohol use. Illicit Drug: No  Family History:   History reviewed. No pertinent family history.     REVIEW OF SYSTEMS:    CONSTITUTIONAL:  negative  MUSCULOSKELETAL:  positive for  pain  All other systems reviewed and negative    PHYSICAL EXAM:    awake, alert, cooperative, no apparent distress, and appears stated age  MUSCULOSKELETAL:  there is no redness, warmth, or swelling of the joints  full range of motion noted  motor strength is 5 out of 5 all extremities bilaterally  tone is normal  with exception of  RIGHT HIP:  redness absent  warmth absent  swelling absent  tenderness present and noted posterior pelvic area and some in the groin. No pain with log roll of the leg. Sensation intact to touch. No deformity. Stable to rock. Good distal pulses. range of motion limited due to some discomfort. Moving foot and ankle. DATA:    CBC:   Recent Labs     11/22/21  1720 11/23/21  0709   WBC 13.3* 10.5   HGB 11.8* 12.0    181     BMP:    Recent Labs     11/22/21  1720 11/23/21  0708    133*   K 3.9 3.5    99   CO2 24 20*   BUN 29* 21*   CREATININE 1.0 0.7   GLUCOSE 118* 134*     INR: No results for input(s): INR in the last 72 hours. Radiology:   CT PELVIS WO CONTRAST Additional Contrast? None   Preliminary Result   1. Acute right parasymphyseal fracture extending into the superior pubic   ramus. 2. Acute mildly overriding right inferior pubic ramus fracture. 3. Acute nondisplaced right anterior and lateral sacral fracture. 4. Osteopenia. 5. Small extraperitoneal pelvic hematoma. Small right pelvic sidewall   hematoma. CT CERVICAL SPINE WO CONTRAST   Preliminary Result   No acute traumatic injury of the cervical spine. Multilevel disc and facet degenerative changes. CT HEAD WO CONTRAST   Final Result   No acute intracranial abnormality. Mild generalized cerebral atrophy and mild chronic small vessel white matter   ischemic changes. XR PELVIS (1-2 VIEWS)   Final Result   Head mildly displaced fracture through the right pubic symphysis and right   inferior pubic ramus. No definite hip fracture. IMPRESSION/RECOMMENDATIONS:    Assessment: pelvic fracture    Plan:  1) Given the results of the plain films we got a CT scan to better evaluate the pelvis and ensure no extension to sacrum and acet. The CT scan did reveal a sacral fracture and given that this is new as well we will plan to limit her WB to 25% on the right side.   No operative intervention is necessary at this time. She can continue to work with PT otherwise. She can f/u with us as an outpatient in 2 weeks. Discussed with Dr Maine Daniels today. Thank you for the opportunity to consult on this patient.     Rhonda Pleitez, [phone]

## 2021-11-23 NOTE — ACP (ADVANCE CARE PLANNING)
Advance Care Planning   Healthcare Decision Maker:    Primary Decision Maker: Bia Manrique - [phone]    Click here to complete Healthcare Decision Makers including selection of the Healthcare Decision Maker Relationship (ie \"Primary\"). Today we documented Decision Maker(s) consistent with Legal Next of Kin hierarchy.

## 2021-11-24 LAB
ANION GAP SERPL CALCULATED.3IONS-SCNC: 10 MMOL/L (ref 3–16)
BASOPHILS ABSOLUTE: 0 K/UL (ref 0–0.2)
BASOPHILS RELATIVE PERCENT: 0.4 %
BUN BLDV-MCNC: 24 MG/DL (ref 7–20)
CALCIUM SERPL-MCNC: 9.1 MG/DL (ref 8.3–10.6)
CHLORIDE BLD-SCNC: 102 MMOL/L (ref 99–110)
CO2: 25 MMOL/L (ref 21–32)
CREAT SERPL-MCNC: 0.7 MG/DL (ref 0.6–1.2)
EOSINOPHILS ABSOLUTE: 0 K/UL (ref 0–0.6)
EOSINOPHILS RELATIVE PERCENT: 0.3 %
GFR AFRICAN AMERICAN: >60
GFR NON-AFRICAN AMERICAN: >60
GLUCOSE BLD-MCNC: 126 MG/DL (ref 70–99)
HCT VFR BLD CALC: 32.8 % (ref 36–48)
HEMOGLOBIN: 10.7 G/DL (ref 12–16)
LYMPHOCYTES ABSOLUTE: 0.9 K/UL (ref 1–5.1)
LYMPHOCYTES RELATIVE PERCENT: 9.6 %
MCH RBC QN AUTO: 31.7 PG (ref 26–34)
MCHC RBC AUTO-ENTMCNC: 32.7 G/DL (ref 31–36)
MCV RBC AUTO: 96.7 FL (ref 80–100)
MONOCYTES ABSOLUTE: 0.7 K/UL (ref 0–1.3)
MONOCYTES RELATIVE PERCENT: 7.3 %
NEUTROPHILS ABSOLUTE: 7.5 K/UL (ref 1.7–7.7)
NEUTROPHILS RELATIVE PERCENT: 82.4 %
PDW BLD-RTO: 14 % (ref 12.4–15.4)
PLATELET # BLD: 169 K/UL (ref 135–450)
PMV BLD AUTO: 8.6 FL (ref 5–10.5)
POTASSIUM REFLEX MAGNESIUM: 3.8 MMOL/L (ref 3.5–5.1)
RBC # BLD: 3.4 M/UL (ref 4–5.2)
SODIUM BLD-SCNC: 137 MMOL/L (ref 136–145)
WBC # BLD: 9.1 K/UL (ref 4–11)

## 2021-11-24 PROCEDURE — 2580000003 HC RX 258: Performed by: NURSE PRACTITIONER

## 2021-11-24 PROCEDURE — 99232 SBSQ HOSP IP/OBS MODERATE 35: CPT

## 2021-11-24 PROCEDURE — 36415 COLL VENOUS BLD VENIPUNCTURE: CPT

## 2021-11-24 PROCEDURE — 1200000000 HC SEMI PRIVATE

## 2021-11-24 PROCEDURE — 80048 BASIC METABOLIC PNL TOTAL CA: CPT

## 2021-11-24 PROCEDURE — 6370000000 HC RX 637 (ALT 250 FOR IP)

## 2021-11-24 PROCEDURE — 85025 COMPLETE CBC W/AUTO DIFF WBC: CPT

## 2021-11-24 PROCEDURE — 51798 US URINE CAPACITY MEASURE: CPT

## 2021-11-24 PROCEDURE — 51701 INSERT BLADDER CATHETER: CPT

## 2021-11-24 RX ORDER — METOPROLOL SUCCINATE 50 MG/1
100 TABLET, EXTENDED RELEASE ORAL DAILY
Status: DISCONTINUED | OUTPATIENT
Start: 2021-11-24 | End: 2021-11-26 | Stop reason: HOSPADM

## 2021-11-24 RX ORDER — LISINOPRIL 20 MG/1
20 TABLET ORAL DAILY
Status: DISCONTINUED | OUTPATIENT
Start: 2021-11-24 | End: 2021-11-26 | Stop reason: HOSPADM

## 2021-11-24 RX ADMIN — SODIUM CHLORIDE, PRESERVATIVE FREE 10 ML: 5 INJECTION INTRAVENOUS at 21:00

## 2021-11-24 RX ADMIN — SODIUM CHLORIDE, PRESERVATIVE FREE 10 ML: 5 INJECTION INTRAVENOUS at 09:55

## 2021-11-24 RX ADMIN — METOPROLOL SUCCINATE 100 MG: 50 TABLET, EXTENDED RELEASE ORAL at 09:54

## 2021-11-24 RX ADMIN — LISINOPRIL 20 MG: 20 TABLET ORAL at 09:54

## 2021-11-24 ASSESSMENT — PAIN SCALES - WONG BAKER: WONGBAKER_NUMERICALRESPONSE: 0

## 2021-11-24 ASSESSMENT — PAIN SCALES - GENERAL
PAINLEVEL_OUTOF10: 0
PAINLEVEL_OUTOF10: 0

## 2021-11-24 NOTE — PROGRESS NOTES
Progress Note    Admit Date:  11/22/2021    80 y.o. female with chronic systolic congestive heart failure, nonischemic cardiomyopathy, aortic valve stenosis, pacemaker in place, dementia, hypertension who presented to [facility] ED with complaint of fall at home. She was brought to the ER for evaluation where she was found to have significant bruising to the right side of her face. Imaging obtained showed a right pubic ramus fracture/lateral sacral fracture/small extraperitoneal pelvic hematoma and a small right pelvic sidewall hematoma. She was admitted for further care and evaluation    Subjective:  Ms. Chaim Forrester is demented at baseline. She is pleasant and denies complaints at this time, however she does not appear to be a reliable historian. Objective:   Patient Vitals for the past 4 hrs:   BP Temp Temp src Pulse Resp SpO2   11/24/21 0750 (!) 171/83 99.3 °F (37.4 °C) Oral 91 16 98 %       Intake/Output Summary (Last 24 hours) at 11/24/2021 0911  Last data filed at 11/24/2021 0636  Gross per 24 hour   Intake 240 ml   Output 300 ml   Net -60 ml     Physical Exam:    Gen: No distress. Alert. Eyes: PERRL. No sclera icterus. No conjunctival injection. ENT: No discharge. Pharynx clear. Neck: Trachea midline. Resp: No accessory muscle use. No crackles. No wheezes. No rhonchi. CV: Regular rate. Regular rhythm. Early systolic murmur noted in the 2nd ICS, right and left sternal borders. No rub. No edema. Peripheral Pulses: +2 palpable, equal bilaterally   GI: Non-tender. Non-distended. Normal bowel sounds. Skin: Warm and dry. No nodule on exposed extremities. No rash on exposed extremities. M/S: No cyanosis. No joint deformity. No clubbing. Neuro: Awake. Grossly nonfocal    Psych: Oriented x 3. No anxiety or agitation.      Scheduled Meds:   metoprolol succinate  50 mg Oral Daily    lisinopril  5 mg Oral Daily    sodium chloride flush  5-40 mL IntraVENous 2 times per day    [Held by provider] enoxaparin  40 mg SubCUTAneous Nightly       Continuous Infusions:   sodium chloride         PRN Meds:  sodium chloride flush, sodium chloride, ondansetron **OR** ondansetron, polyethylene glycol, acetaminophen **OR** acetaminophen, HYDROcodone 5 mg - acetaminophen    Data:  CBC:   Recent Labs     11/22/21  1720 11/23/21  0709 11/24/21  0643   WBC 13.3* 10.5 9.1   HGB 11.8* 12.0 10.7*   HCT 36.3 36.9 32.8*   MCV 96.9 97.7 96.7    181 169     BMP:   Recent Labs     11/22/21  1720 11/23/21  0708 11/24/21  0643    133* 137   K 3.9 3.5 3.8    99 102   CO2 24 20* 25   BUN 29* 21* 24*   CREATININE 1.0 0.7 0.7     CULTURES  Results for Kirit Ambrose (MRN [id number]) as of 11/24/2021 11:03   Ref. Range 11/22/2021 17:30   SARS-CoV-2, NAAT Latest Ref Range: Not Detected  Not Detected        RADIOLOGY  CT PELVIS WO CONTRAST Additional Contrast? None   Final Result   1. Acute right parasymphyseal fracture extending into the superior pubic   ramus. 2. Acute mildly overriding right inferior pubic ramus fracture. 3. Acute nondisplaced right anterior and lateral sacral fracture. 4. Osteopenia. 5. Small extraperitoneal pelvic hematoma. Small right pelvic sidewall   hematoma. CT CERVICAL SPINE WO CONTRAST   Preliminary Result   No acute traumatic injury of the cervical spine. Multilevel disc and facet degenerative changes. CT HEAD WO CONTRAST   Final Result   No acute intracranial abnormality. Mild generalized cerebral atrophy and mild chronic small vessel white matter   ischemic changes. XR PELVIS (1-2 VIEWS)   Final Result   Head mildly displaced fracture through the right pubic symphysis and right   inferior pubic ramus. No definite hip fracture.            Assessment/Plan:  #Mechanical fall  - dtr reports patient tripped while walking into home, no LOC  - PT OT eval- recommend SNF  - CM consult for SNF placement      #Closed head injury   - CT head no intracranial abnormality  - monitor     #Pelvic fracture  #Pelvic hematoma   - Seen by ortho surg- no surg plans  - PT OT  - pain control- prn acetaminophen and norco      #Chronic systolic CHF  #Nonischemic CMP  #Aortic stenosis  - No evidence of acute CHF     #Pacemaker in place   - per chart review     #HTN  - BP is elevated yesterday and this morning  - Will increase BP meds today  - metoprolol  mg daily (home dose)  - Lisinopril increase from 5 mg to 20 mg daily (instead of home benazepril 40 mg daily)  - Hold Norvasc  - increase BP meds as BP indicates     #Dementia   - at baseline     DVT Prophylaxis: SCD 2/2 hematomas  Diet: ADULT DIET;  Regular  Code Status: Full Code    Alisha Sandoval PA-C  11/24/2021 11:04 AM

## 2021-11-24 NOTE — PROGRESS NOTES
Bladder scanned patient with 790 ml, Md notifed and ordered straight cath. Straight cath output 300ml.

## 2021-11-24 NOTE — PROGRESS NOTES
Pt a/o. Am assessment completed see flow sheet. Pt denies any pain/ needs at this time. Call light within reach.

## 2021-11-24 NOTE — PROGRESS NOTES
Patient resting in bed with eyes closed, opens eyes to name. No acute distress noted at this time. Call light in reach.

## 2021-11-24 NOTE — CARE COORDINATION
Message left for Dtr, Pasquale Navarro to call back to discuss SNF placement and make referral. Awaiting call back at this time. Addendum 1159am: Spoke to Pasquale Navarro via TC who states that she never recieved email with list of facilities. Medicare. gov facility list emailed to Pasquale Navarro per request. After sending email, received phone call from Pasquale Navarro who states that after talking to some family members they request a referral to [facility]. Referral sent and awaiting decision. Addendum 1435pm: [facility] able to accept the Pt. Potential bed availability on Friday, but most likely Saturday. Will follow pending progress. Pt with HTN the last two days, increasing meds. Will follow.

## 2021-11-24 NOTE — PROGRESS NOTES
Handoff report given to JAY Tee. Care transferred.      Electronically signed by Rishabh Warren RN on 11/24/2021 at 7:24 AM

## 2021-11-24 NOTE — PLAN OF CARE
Problem: Falls - Risk of:  Goal: Will remain free from falls  11/24/2021 1249 by Erin Rosales RN  Outcome: Ongoing  11/23/2021 2328 by Chasidy Reyna RN  Outcome: Ongoing   Bed alarm on for safety.

## 2021-11-24 NOTE — PROGRESS NOTES
PM assessment completed. Scheduled medications given per MAR. VSS room air, A/O to self, patient does not appear in distress, does not appear in pain, denies any needs at this time. Call light in reach, will monitor, bed alarm on .

## 2021-11-25 LAB
ANION GAP SERPL CALCULATED.3IONS-SCNC: 11 MMOL/L (ref 3–16)
BASOPHILS ABSOLUTE: 0 K/UL (ref 0–0.2)
BASOPHILS RELATIVE PERCENT: 0.3 %
BUN BLDV-MCNC: 24 MG/DL (ref 7–20)
CALCIUM SERPL-MCNC: 8.7 MG/DL (ref 8.3–10.6)
CHLORIDE BLD-SCNC: 102 MMOL/L (ref 99–110)
CO2: 22 MMOL/L (ref 21–32)
CREAT SERPL-MCNC: 0.6 MG/DL (ref 0.6–1.2)
EOSINOPHILS ABSOLUTE: 0.1 K/UL (ref 0–0.6)
EOSINOPHILS RELATIVE PERCENT: 0.7 %
GFR AFRICAN AMERICAN: >60
GFR NON-AFRICAN AMERICAN: >60
GLUCOSE BLD-MCNC: 108 MG/DL (ref 70–99)
HCT VFR BLD CALC: 31.4 % (ref 36–48)
HEMOGLOBIN: 10.6 G/DL (ref 12–16)
LYMPHOCYTES ABSOLUTE: 1.2 K/UL (ref 1–5.1)
LYMPHOCYTES RELATIVE PERCENT: 11.6 %
MCH RBC QN AUTO: 32.6 PG (ref 26–34)
MCHC RBC AUTO-ENTMCNC: 33.6 G/DL (ref 31–36)
MCV RBC AUTO: 97 FL (ref 80–100)
MONOCYTES ABSOLUTE: 0.8 K/UL (ref 0–1.3)
MONOCYTES RELATIVE PERCENT: 8 %
NEUTROPHILS ABSOLUTE: 8.2 K/UL (ref 1.7–7.7)
NEUTROPHILS RELATIVE PERCENT: 79.4 %
PDW BLD-RTO: 13.8 % (ref 12.4–15.4)
PLATELET # BLD: 171 K/UL (ref 135–450)
PMV BLD AUTO: 8.8 FL (ref 5–10.5)
POTASSIUM SERPL-SCNC: 4 MMOL/L (ref 3.5–5.1)
RBC # BLD: 3.24 M/UL (ref 4–5.2)
SODIUM BLD-SCNC: 135 MMOL/L (ref 136–145)
WBC # BLD: 10.4 K/UL (ref 4–11)

## 2021-11-25 PROCEDURE — 97110 THERAPEUTIC EXERCISES: CPT

## 2021-11-25 PROCEDURE — 2580000003 HC RX 258: Performed by: NURSE PRACTITIONER

## 2021-11-25 PROCEDURE — 97530 THERAPEUTIC ACTIVITIES: CPT

## 2021-11-25 PROCEDURE — 80048 BASIC METABOLIC PNL TOTAL CA: CPT

## 2021-11-25 PROCEDURE — 6370000000 HC RX 637 (ALT 250 FOR IP)

## 2021-11-25 PROCEDURE — 85025 COMPLETE CBC W/AUTO DIFF WBC: CPT

## 2021-11-25 PROCEDURE — 36415 COLL VENOUS BLD VENIPUNCTURE: CPT

## 2021-11-25 PROCEDURE — 1200000000 HC SEMI PRIVATE

## 2021-11-25 RX ADMIN — METOPROLOL SUCCINATE 100 MG: 50 TABLET, EXTENDED RELEASE ORAL at 09:02

## 2021-11-25 RX ADMIN — LISINOPRIL 20 MG: 20 TABLET ORAL at 09:02

## 2021-11-25 RX ADMIN — SODIUM CHLORIDE, PRESERVATIVE FREE 10 ML: 5 INJECTION INTRAVENOUS at 20:28

## 2021-11-25 RX ADMIN — SODIUM CHLORIDE, PRESERVATIVE FREE 10 ML: 5 INJECTION INTRAVENOUS at 09:02

## 2021-11-25 ASSESSMENT — PAIN SCALES - GENERAL
PAINLEVEL_OUTOF10: 0
PAINLEVEL_OUTOF10: 0

## 2021-11-25 NOTE — PROGRESS NOTES
Patient gotten up to [address] per PT/OT, voided small amount of strong smelling urine, Incontinent of lg amt yellow colored urine, mandeep care given. Patient denies any complains of pain or discomfort on urination or complains of urgency. Fluids encouraged.

## 2021-11-25 NOTE — FLOWSHEET NOTE
11/25/21 0845   Vital Signs   Temp 97.1 °F (36.2 °C)   Temp Source Oral   Pulse 84   Heart Rate Source Monitor   Resp 16   BP (!) 162/77   BP Location Right upper arm   Patient Position Semi fowlers   Level of Consciousness Alert (0)   MEWS Score 1   Patient Currently in Pain Denies   Pain Assessment   Pain Assessment 0-10   Pain Level 0   Oxygen Therapy   SpO2 95 %   O2 Device None (Room air)     Patient alert to self only. HOB up 45 degrees, for breakfast. RLE neurovascular check done see flow sheet. Denies any complains of pain or discomfort. Bruising remains of Right side of face and RLE. Bed in low position. Call bell within reach. Bed alarm on. Bedside table within reach. Non skin slippers on and fall signs posted. Will continue to monitor.

## 2021-11-25 NOTE — PROGRESS NOTES
Inpatient Physical Therapy Daily Treatment Note    Unit: 2 [place]  Date:  11/25/2021  Patient Name:    Carolyn Pineda  Admitting diagnosis:  Hematoma [T14. 8XXA]  Closed head injury, initial encounter [G66.46DG]  Fall, initial encounter [W19. XXXA]  Fall at home, initial encounter [Q50. XXXA, Y92.009]  Closed fracture of ramus of right pubis, initial encounter ([facility] [address] [S32.591A]  Closed fracture dislocation of right side of symphysis pubis, initial encounter [facility]) Jaja Marquez  Admit Date:  11/22/2021  Precautions/Restrictions:  Fall risk, Bed/chair alarm, Lines -IV, Confusion, [place] (hard of hearing) and 25% PWB on R      Discharge Recommendations: SNF  DME needs for discharge: defer to facility       Therapy recommendation for EMS Transport: requires transport by cot due to difficulty tolerating upright positioning due to sacral fracture    Therapy recommendations for staff:   Assist of 1 with use of CHAR STEDY for all transfers to/from [facility]  to/from [facility]    History of Present Illness: Per chart review \"89 y. o. female for closed head injury. Onset was today.  Context includes pt arrived home with family after having breakfast. Daughter reports she tripped over her own feet. Pt denies any dizziness or light headedness. Pt denies any loc or use of blood thinners. Alleviating factors include nothing.  Aggravating factors include nothing. Pain is 0/10. nothing has been used for pain today  Pelvis X-ray noted with displaced fx through the right pubic symphysis and right inferior pubic ramus  Ortho consulted in ED, ok for weight bearing as tolerated\"   Noted updated ortho note downgraded WB status to 25% PWB. Per ortho note dated 11/23/21 HARIKA King:  Robert Cuff the results of the plain films we got a CT scan to better evaluate the pelvis and ensure no extension to sacrum and acet. The CT scan did reveal a sacral fracture and given that this is new as well we will plan to limit her WB to 25% on the right side.   No operative intervention is necessary at this time. She can continue to work with PT otherwise. She can f/u with us as an outpatient in 2 weeks. Discussed with Dr Gerber Quintana today. \"     Home Health S4 Level Recommendation: NA  AM-PAC Mobility Score   AM-PAC Inpatient Mobility Raw Score : 13       Treatment Time:  1275-4741  Treatment number: 2  Timed Code Treatment Minutes: 40 minutes  Total Treatment Minutes:  40  minutes    Cognition    A&O Person   Able to follow 1 step commands inconsistently   Confusion noted throughout session requiring repeated commands, step by step direction for activity, reorientation to situation, reminders for WB restrictions    Subjective  Patient lying supine in bed with no family present   Pt agreeable to this PT tx. Pt requesting to use restroom. Asking to ambulate to bathroom    Pain   Yes  Location: R pelvic-sacral   Rating:    severe  Pain Medicine Status: Denies need ; pain resolved with rest and repositioning    Bed Mobility   Supine to Sit:    Max A   Sit to Supine:   Max A   Rolling: Max A   Scooting: Max A     Transfer Training     Sit to stand: Mod A  - with STEDY (cues for PWB); cues for full extension  X 2 reps  Stand to sit:   Mod A  - with STEDY x 2 reps  Bed to Chair:   Total A with use of CHAR STEDY   Noted decreased ROM in R shoulder with difficulty reaching STEDY bar    Gait Training  Deferred due to inability to maintain PWB and pain with standing activities     Therapeutic Exercise all completed bilaterally unless indicated  Ankle Pumps x 10 reps  LAQ x 10 reps    Balance  Sitting:  Fair +; CGA  Comments: BLE and RUE support    Standing: Poor; Total A  Comments: cues for extension, required BUE support of STEDY bar; able to maintain stance x 30 sec for pericare with cues    Patient Education      Role of PT, POC, Discharge recommendations, safety awareness, transfer techniques and calling for assist with mobility.      Positioning Needs       Pt in bed, alarm set, positioned in proper neutral alignment and pressure relief provided. Call light provided and all needs within reach      Activity Tolerance   Pt completed therapy session with Pain noted with bed mobility, transfers and standing. Resolved with seated rest and positioning in bed    Other  Incontinent of large amount of urine, urinated small amount in BSC - foul smelling and dark in color  Dependent mandeep-care  RN made aware    Assessment :  Patient limited in standing tolerance due to high pain levels with continued confusion requiring constant cues and step by step direction for motor planning. Required use of STEDY for transfers today due to downgraded WB status, per ortho, with difficulty adhering to precaution. Recommending SNF upon discharge as patient functioning well below baseline, demonstrates good rehab potential and unable to return home due to burden of care beyond caregiver ability, home environment not conducive to patient recovery and limited safety awareness. Goals (all goals ongoing unless otherwise indicated)  To be met in 3 visits:  1). Independent with LE Ex x 10 reps     To be met in 6 visits:   Goals revised as of 11/25/21 due to downgraded WB status  1). Supine to/from sit: min A  2). Sit to/from stand:  min A  3). Bed to chair:  mod A  4). 5). Tolerate B LE exercises 3 sets of 10-15 reps  6).         Plan   Continue with plan of care. Signature: Neeta Fofana, PT, DPT, OMT-C #[id number]      If patient discharges from this facility prior to next visit, this note will serve as the Discharge Summary.

## 2021-11-25 NOTE — PROGRESS NOTES
Occupational Therapy  Occupational Therapy Daily Treatment Note    Unit: 2 [place]  Date:  11/25/2021  Patient Name:    Namiat Patiño  Admitting diagnosis:  Hematoma [T14. 8XXA]  Closed head injury, initial encounter [D56.01FK]  Fall, initial encounter [W19. XXXA]  Fall at home, initial encounter [G85. XXXA, Y92.009]  Closed fracture of ramus of right pubis, initial encounter ([facility] [address] [S32.591A]  Closed fracture dislocation of right side of symphysis pubis, initial encounter ([facility] [address] Jaz Andino  Admit Date:  11/22/2021  Precautions/Restrictions:  fall risk, bed/chair alarm and confusion 25% WB on R LE        Discharge Recommendations: SNF  DME needs for discharge: defer to facility       Therapy recommendations for staff:   Assist of 1 with use of rolling walker (RW) for all transfers to/from BSC/chair    AM-PAC Score: AM-PAC Inpatient Daily Activity Raw Score: 13  Home Health S4 Level: NA       Treatment Time:  9673-7965  Treatment number:  2    Total Treatment Time:   15 minutes    History of Present Illness: 80 y. o. female for closed head injury. Onset was today.  Context includes pt arrived home with family after having breakfast. Daughter reports she tripped over her own feet. Pt denies any dizziness or light headedness. Pt denies any loc or use of blood thinners. Alleviating factors include nothing.  Aggravating factors include nothing. Pain is 0/10. nothing has been used for pain today     Pelvis X-ray noted with displaced fx through the right pubic symphysis and right inferior pubic ramus  Ortho consulted in ED, ok for weight bearing as tolerated     Upon entering room pt was alert but was unable to answer questions such as full name, year she was born or where she was. Nurse stated she was oriented x4 earlier and when nurse asked orientation questions again pt was unable to answer all questions.   There was no PMH in chart so called daughter to ask all environment and PLOF and daughter stated she does suspect dementia but she is not dx. Subjective:  Pt was sleeping, however woke easily. Pt confused and did not follow commands, required to be shown what to do. Pain   No  Rating: NA  Location:   Pain Medicine Status: Denies need      Bed Mobility:   Supine to Sit:  Not Tested  Sit to Supine:  Not Tested  Rolling:           Not Tested  Scooting:        Not Tested    Transfer Training:   Sit to stand:   Not Tested  Stand to sit:  Not Tested  Bed to Chair:  Not Tested  Bed to [facility]:   Not Tested  Standard toilet:   Not Tested    Activity Tolerance   Pt completed therapy session with No adverse symptoms noted w/activity  SpO2:    HR:    BP:      ADL Training:   Upper body dressing:  Not Tested  Upper body bathing:  Not Tested  Lower body dressing:  Not Tested  Lower body bathing:  Not Tested  Toileting:   Not Tested  Grooming/Hygiene:  Not Tested    Therapeutic Exercise:   Hand flex/ext:  x10  Wrist flex/ext:  x10  Forearm sup/pronation:  x10  Shoulder flex/ext:  x10  Shoulder horizontal abd/add:  x10  Shoulder shrugs:  x10  Elbow flex/ext x10   Limited range for RUE in the shoulder    Patient Education:   Role of OT  Recommendations for DC    Positioning Needs: In bed, call light and needs in reach. Family Present:  No    Assessment: Pt was in bed asleep upon entry. Pt required visual commands vs verbal due to confusion. Pt was Ox1. Pt would benefit from a skilled nursing facility with skilled rehab. GOALS  To be met in 3 Visits:  1). Bed to toilet/BSC: CGA     To be met in 5 Visits:  1). Supine to/from Sit:             CGA      4). Upper Body Dressing:       SBA  5). Lower Body Dressing:       Min A  6).  Pt to demonstrate UE exs x 15 reps with minimal cues      Plan: cont with [address], [place]/L [id number]      If patient discharges from this facility prior to next visit, this note will serve as the Discharge Summary

## 2021-11-25 NOTE — DISCHARGE INSTR - COC
Assisted  Toileting  Dependent  Feeding  Independent  Med Admin  Dependent  Med Delivery   whole    Wound Care Documentation and Therapy:        Elimination:  Continence: Bowel: No  Bladder: No  Urinary Catheter: None   Colostomy/Ileostomy/Ileal Conduit: No       Date of Last BM: 11/25/21    Intake/Output Summary (Last 24 hours) at 11/25/2021 1034  Last data filed at 11/25/2021 0845  Gross per 24 hour   Intake 360 ml   Output --   Net 360 ml     I/O last 3 completed shifts: In: 120 [P.O.:120]  Out: -     Safety Concerns:     History of Falls (last 30 days) and At Risk for Falls    Impairments/Disabilities:      Hearing    Nutrition Therapy:  Current Nutrition Therapy:   - Oral Diet:  General    Routes of Feeding: Oral  Liquids: Thin Liquids  Daily Fluid Restriction: no  Last Modified Barium Swallow with Video (Video Swallowing Test): not done    Treatments at the Time of Hospital Discharge:   Respiratory Treatments: none   Oxygen Therapy:  is not on home oxygen therapy. Ventilator:    - No ventilator support    Rehab Therapies: Physical Therapy and Occupational Therapy  Weight Bearing Status/Restrictions: Touchdown weight bearing (10-25 lbs) only on right leg  Other Medical Equipment (for information only, NOT a DME order):  walker and hospital bed  Other Treatments: none     Patient's personal belongings (please select all that are sent with patient):   All belongings sent to SNF with patient     RN SIGNATURE:  Electronically signed by Chica Corona RN on 11/26/21 at 11:24 AM EST    CASE MANAGEMENT/SOCIAL WORK SECTION    Inpatient Status Date: 11/22/2021    Readmission Risk Assessment Score:  Readmission Risk              Risk of Unplanned Readmission:  10           Discharging to Facility/ Agency   Name: [facility]  Address: [address] [place] [address], [address]  Phone: [phone]  Fax: [phone]     Dialysis Facility (if applicable)   Name:  Address:  Dialysis Schedule:  Phone:  Fax:    Case Manager/ signature: Electronically signed by Marquita Villalpando RN on 11/26/21 at 10:21 AM EST    PHYSICIAN SECTION    Prognosis: Guarded    Condition at Discharge: Stable    Rehab Potential (if transferring to Rehab): Guarded    Recommended Labs or Other Treatments After Discharge:       F    Physician Certification: I certify the above information and transfer of Flor Wheat  is necessary for the continuing treatment of the diagnosis listed and that she requires [place] for greater 30 days.      Update Admission H&P: No change in H&P    PHYSICIAN SIGNATURE:  Electronically signed by Rob Glez MD on 11/25/21 at 10:34 AM EST

## 2021-11-25 NOTE — PROGRESS NOTES
Patient resting quietly in bed. All needs met. Call bell within reach, bed in low position, bed alarm on, bedside table within reach. Will continue to monitor.

## 2021-11-25 NOTE — PROGRESS NOTES
Patient took in 240 ml and ate toast for breakfast. Turned and repositioned, mandeep care given for incontinence. Watching Thanksgiving parade on TV. Bed in low position, call bell within reach. Bed alarm on.

## 2021-11-25 NOTE — PLAN OF CARE
Problem: Falls - Risk of:  Goal: Will remain free from falls  Description: Will remain free from falls  11/24/2021 2211 by Claudy Soler RN  Outcome: Ongoing  11/24/2021 1249 by Alessia Carvajal RN  Outcome: Ongoing  Goal: Absence of physical injury  Description: Absence of physical injury  Outcome: Ongoing     Problem: Skin Integrity:  Goal: Will show no infection signs and symptoms  Description: Will show no infection signs and symptoms  Outcome: Ongoing  Goal: Absence of new skin breakdown  Description: Absence of new skin breakdown  Outcome: Ongoing

## 2021-11-25 NOTE — FLOWSHEET NOTE
11/25/21 1415   Vital Signs   Temp 97.3 °F (36.3 °C)   Temp Source Oral   Pulse 86   Heart Rate Source Monitor   Resp 18   BP (!) 101/56   BP Location Right upper arm   Patient Position Semi fowlers   Level of Consciousness Alert (0)   MEWS Score 1   Patient Currently in Pain Denies   Pain Assessment   Pain Assessment 0-10   Pain Level 0   Oxygen Therapy   SpO2 94 %   O2 Device None (Room air)     Patient fed self 30% of lunch, family here at bedside. Denies any complains of pain or discomfort. Right Hip remains discolored. Facial discoloration remains. Turned and repositioned. Marge care given. Bed in low position. Call bell within reach. Bed alarm on bedside table within reach. Will continue to monitor.

## 2021-11-25 NOTE — PROGRESS NOTES
Pt evening shift assessment complete. VSS and medication given as ordered. Pt assessed for comfort needs,taken to the bathroom. Pt is confused. Pt does not express any additional needs at this time, resting comfortably in bed.

## 2021-11-26 VITALS
OXYGEN SATURATION: 97 % | DIASTOLIC BLOOD PRESSURE: 71 MMHG | BODY MASS INDEX: 18.69 KG/M2 | RESPIRATION RATE: 16 BRPM | HEIGHT: 67 IN | TEMPERATURE: 97.7 F | WEIGHT: 119.05 LBS | SYSTOLIC BLOOD PRESSURE: 168 MMHG | HEART RATE: 72 BPM

## 2021-11-26 LAB
ANION GAP SERPL CALCULATED.3IONS-SCNC: 10 MMOL/L (ref 3–16)
BASOPHILS ABSOLUTE: 0 K/UL (ref 0–0.2)
BASOPHILS RELATIVE PERCENT: 0.2 %
BUN BLDV-MCNC: 31 MG/DL (ref 7–20)
CALCIUM SERPL-MCNC: 8.6 MG/DL (ref 8.3–10.6)
CHLORIDE BLD-SCNC: 100 MMOL/L (ref 99–110)
CO2: 24 MMOL/L (ref 21–32)
CREAT SERPL-MCNC: 0.7 MG/DL (ref 0.6–1.2)
EOSINOPHILS ABSOLUTE: 0.1 K/UL (ref 0–0.6)
EOSINOPHILS RELATIVE PERCENT: 0.8 %
GFR AFRICAN AMERICAN: >60
GFR NON-AFRICAN AMERICAN: >60
GLUCOSE BLD-MCNC: 107 MG/DL (ref 70–99)
HCT VFR BLD CALC: 29.4 % (ref 36–48)
HEMOGLOBIN: 10 G/DL (ref 12–16)
INFLUENZA A: NOT DETECTED
INFLUENZA B: NOT DETECTED
LYMPHOCYTES ABSOLUTE: 1.2 K/UL (ref 1–5.1)
LYMPHOCYTES RELATIVE PERCENT: 13.2 %
MCH RBC QN AUTO: 32.7 PG (ref 26–34)
MCHC RBC AUTO-ENTMCNC: 33.9 G/DL (ref 31–36)
MCV RBC AUTO: 96.4 FL (ref 80–100)
MONOCYTES ABSOLUTE: 0.7 K/UL (ref 0–1.3)
MONOCYTES RELATIVE PERCENT: 7.7 %
NEUTROPHILS ABSOLUTE: 6.8 K/UL (ref 1.7–7.7)
NEUTROPHILS RELATIVE PERCENT: 78.1 %
PDW BLD-RTO: 13.5 % (ref 12.4–15.4)
PLATELET # BLD: 188 K/UL (ref 135–450)
PMV BLD AUTO: 8.8 FL (ref 5–10.5)
POTASSIUM SERPL-SCNC: 3.6 MMOL/L (ref 3.5–5.1)
RBC # BLD: 3.05 M/UL (ref 4–5.2)
SARS-COV-2 RNA, RT PCR: NOT DETECTED
SODIUM BLD-SCNC: 134 MMOL/L (ref 136–145)
WBC # BLD: 8.7 K/UL (ref 4–11)

## 2021-11-26 PROCEDURE — 6370000000 HC RX 637 (ALT 250 FOR IP)

## 2021-11-26 PROCEDURE — 85025 COMPLETE CBC W/AUTO DIFF WBC: CPT

## 2021-11-26 PROCEDURE — 36415 COLL VENOUS BLD VENIPUNCTURE: CPT

## 2021-11-26 PROCEDURE — 87636 SARSCOV2 & INF A&B AMP PRB: CPT

## 2021-11-26 PROCEDURE — 80048 BASIC METABOLIC PNL TOTAL CA: CPT

## 2021-11-26 PROCEDURE — 2580000003 HC RX 258: Performed by: NURSE PRACTITIONER

## 2021-11-26 RX ADMIN — LISINOPRIL 20 MG: 20 TABLET ORAL at 08:07

## 2021-11-26 RX ADMIN — SODIUM CHLORIDE, PRESERVATIVE FREE 10 ML: 5 INJECTION INTRAVENOUS at 08:07

## 2021-11-26 RX ADMIN — METOPROLOL SUCCINATE 100 MG: 50 TABLET, EXTENDED RELEASE ORAL at 08:08

## 2021-11-26 ASSESSMENT — PAIN SCALES - GENERAL: PAINLEVEL_OUTOF10: 0

## 2021-11-26 NOTE — PLAN OF CARE
Problem: Falls - Risk of:  Goal: Will remain free from falls  Description: Will remain free from falls  11/25/2021 2012 by Ami Gaxiola RN  Outcome: Ongoing  11/25/2021 1128 by Meagan Mars RN  Outcome: Ongoing  Goal: Absence of physical injury  Description: Absence of physical injury  11/25/2021 2012 by Ami Gaxiola RN  Outcome: Ongoing  11/25/2021 1128 by Meagan Mars RN  Outcome: Ongoing     Problem: Skin Integrity:  Goal: Will show no infection signs and symptoms  Description: Will show no infection signs and symptoms  11/25/2021 2012 by Ami Gaxiola RN  Outcome: Ongoing  11/25/2021 1128 by Meagan Mars RN  Outcome: Ongoing  Goal: Absence of new skin breakdown  Description: Absence of new skin breakdown  11/25/2021 2012 by Ami Gaxiola RN  Outcome: Ongoing  11/25/2021 1128 by Meagan Mars RN  Outcome: Ongoing

## 2021-11-26 NOTE — PLAN OF CARE
Problem: Falls - Risk of:  Goal: Will remain free from falls  Description: Will remain free from falls  11/26/2021 0813 by Vadim Beckman RN  Outcome: Ongoing  11/26/2021 0813 by Vadim Beckman RN  Outcome: Ongoing  11/25/2021 2012 by Ji Deluca RN  Outcome: Ongoing  Goal: Absence of physical injury  Description: Absence of physical injury  11/26/2021 0813 by Vadim Beckman RN  Outcome: Ongoing  11/26/2021 0813 by Vadim Beckman RN  Outcome: Ongoing  11/25/2021 2012 by Ji Deluca RN  Outcome: Ongoing     Problem: Skin Integrity:  Goal: Will show no infection signs and symptoms  Description: Will show no infection signs and symptoms  11/26/2021 0813 by Vadim Beckman RN  Outcome: Ongoing  11/26/2021 0813 by Vadim Beckman RN  Outcome: Ongoing  11/25/2021 2012 by Ji Deluca RN  Outcome: Ongoing  Goal: Absence of new skin breakdown  Description: Absence of new skin breakdown  11/26/2021 0813 by Vadim Beckman RN  Outcome: Ongoing  11/26/2021 0813 by Vadim Beckman RN  Outcome: Ongoing  11/25/2021 2012 by Ji Deluca RN  Outcome: Ongoing

## 2021-11-26 NOTE — DISCHARGE SUMMARY
Hospital Medicine Discharge Summary    Patient ID: Remington Graham      Patient's PCP: Rock Trejo, APRN - CNP    Admit Date: 11/22/2021     Discharge Date: 11/26/2021      Admitting Physician: Marian Ortiz MD     Discharge Physician: Paul Ziegler MD     Discharge Diagnoses: Active Hospital Problems    Diagnosis     Hypertension [I10]     Closed fracture dislocation of right side of symphysis pubis ([address] [S32.591A]     Closed head injury [S09.90XA]     Hematoma [T14. 8XXA]     Chronic systolic CHF (congestive heart failure) (HCC) [I50.22]     Aortic valve stenosis [I35.0]     Fall [W19. XXXA]        The patient was seen and examined on day of discharge and this discharge summary is in conjunction with any daily progress note from day of discharge. Hospital Course: 80 y. o. female with chronic systolic congestive heart failure, nonischemic cardiomyopathy, aortic valve stenosis, pacemaker in place, dementia, hypertension who presented to [facility] with complaint of fall at home.  She was brought to the ER for evaluation where she was found to have significant bruising to the right side of her face.  Imaging obtained showed a right pubic ramus fracture/lateral sacral fracture/small extraperitoneal pelvic hematoma and a small right pelvic sidewall hematoma.  She was admitted for further care and evaluation    #Mechanical fall  - dtr reports patient tripped while walking into home, no LOC  - PT OT eval- recommend SNF  - CM consult for SNF placement      #Closed head injury   - CT head no intracranial abnormality  - monitor     #Pelvic fracture  #Pelvic hematoma   - Seen by ortho surg- no surg plans  - PT OT  - pain control- prn acetaminophen and norco      #Chronic systolic CHF  #Nonischemic CMP  #Aortic stenosis  - No evidence of acute CHF     #Pacemaker in place   - per chart review     #HTN  - BP is elevated yesterday and this morning  - Will increase BP meds today  - metoprolol  mg daily (home dose)  - Lisinopril increase from 5 mg to 20 mg daily (instead of home benazepril 40 mg daily)  - Hold Norvasc  - increase BP meds as BP indicates     #Dementia   - at baseline       Physical Exam Performed:     BP (!) 168/71   Pulse 72   Temp 97.7 °F (36.5 °C) (Oral)   Resp 16   Ht 5' 7\" (1.702 m)   Wt 119 lb 0.8 oz (54 kg)   SpO2 97%   BMI 18.65 kg/m²       Gen: No distress. Alert. Eyes: PERRL. No sclera icterus. No conjunctival injection. ENT: No discharge. Pharynx clear. Neck: Trachea midline. Resp: No accessory muscle use. No crackles. No wheezes. No rhonchi. CV: Regular rate. Regular rhythm. Early systolic murmur noted in the 2nd ICS, right and left sternal borders. No rub. No edema. Peripheral Pulses: +2 palpable, equal bilaterally   GI: Non-tender. Non-distended. Normal bowel sounds. Skin: Warm and dry. No nodule on exposed extremities. No rash on exposed extremities. M/S: No cyanosis. No joint deformity. No clubbing. Neuro: Awake. Grossly nonfocal    Psych: Oriented x 3. No anxiety or agitation.          Labs: For convenience and continuity at follow-up the following most recent labs are provided:      CBC:    Lab Results   Component Value Date    WBC 8.7 11/26/2021    HGB 10.0 11/26/2021    HCT 29.4 11/26/2021     11/26/2021       Renal:    Lab Results   Component Value Date     11/26/2021    K 3.6 11/26/2021    K 3.8 11/24/2021     11/26/2021    CO2 24 11/26/2021    BUN 31 11/26/2021    CREATININE 0.7 11/26/2021    CALCIUM 8.6 11/26/2021         Significant Diagnostic Studies    Radiology:   CT PELVIS WO CONTRAST Additional Contrast? None   Final Result   1. Acute right parasymphyseal fracture extending into the superior pubic   ramus. 2. Acute mildly overriding right inferior pubic ramus fracture. 3. Acute nondisplaced right anterior and lateral sacral fracture. 4. Osteopenia. 5. Small extraperitoneal pelvic hematoma.   Small right pelvic sidewall   hematoma. CT CERVICAL SPINE WO CONTRAST   Preliminary Result   No acute traumatic injury of the cervical spine. Multilevel disc and facet degenerative changes. CT HEAD WO CONTRAST   Final Result   No acute intracranial abnormality. Mild generalized cerebral atrophy and mild chronic small vessel white matter   ischemic changes. XR PELVIS (1-2 VIEWS)   Final Result   Head mildly displaced fracture through the right pubic symphysis and right   inferior pubic ramus. No definite hip fracture. Consults:     IP CONSULT TO ORTHOPEDIC SURGERY  IP CONSULT TO HOSPITALIST  IP CONSULT TO CASE MANAGEMENT  IP CONSULT TO ORTHOPEDIC SURGERY    Disposition: ECF    Condition at Discharge: Stable    Discharge Instructions/Follow-up:  PCP 1 week. Code Status:  Prior     Activity: activity as tolerated    Diet: regular diet      Discharge Medications:     Discharge Medication List as of 11/26/2021 11:37 AM           Details   metoprolol succinate (TOPROL XL) 100 MG extended release tablet Take 100 mg by mouth dailyHistorical Med             Time Spent on discharge is more than 30 minutes in the examination, evaluation, counseling and review of medications and discharge plan. Signed:    Asaf Lowe MD   11/26/2021      Thank you JANET Brower CNP for the opportunity to be involved in this patient's care. If you have any questions or concerns please feel free to contact me at [phone].

## 2021-11-26 NOTE — CARE COORDINATION
DISCHARGE ORDER  Date/Time 2021 11:40 AM  Completed by: Yeimy Mane RN, Case Management    Patient Name: Criss Duran    : 1925      Admit order Date and Status:2021  Noted discharge order. (verify MD's last order for status of admission/Traditional Medicare 3 [place] Inpatient qualifying stay required for SNF)    Confirmed discharge plan with:              Patient:  No-dementia              When pt confirms DC plan does any support person need to be contacted by CM Yes if yes who__dtr Lisa____                      Discharge to Facility: [address] [facility]. phone number for staff giving report: [phone] completed: [facility] Exemption Notification (HENS) completed: HENS   Discharge orders and Continuity of Care faxed to facility:  yes      Transportation:               Medical Transport explained with choice list offered to pt/family. Choice:Yes(no preference)  Agency used: [facility]   time:   9729      Pt/family/Nursing/Facility aware of  time:   Yes Names: dtr Anna/bedside RN Corie/María 2 DANUTA  Ambulance form completed:  yes:      Comments:Chart reviewed. Dtr Yon Jackson remains agreeable to [address] @ [facility] and is aware of  time. María @ [facility] can accept pt today with negative PCR, which was obtained at this time. Pt is being d/c'd to [facility]  today. Pt's O2 sats are 97% on RA. Discharge timeout done with Prateek Barry. All discharge needs and concerns addressed. Discharging nurse to complete MARIAH, reconcile AVS, and place final copy with patient's discharge packet. Discharging RN to ensure that written prescriptions for  Level II medications are sent with patient to the facility as per protocol.

## 2021-11-26 NOTE — DISCHARGE INSTR - DIET
Good nutrition is important when healing from an illness, injury, or surgery. Follow any nutrition recommendations given to you during your hospital stay. If you were given an oral nutrition supplement while in the hospital, continue to take this supplement at home. You can take it with meals, in-between meals, and/or before bedtime. These supplements can be purchased at most local grocery stores, pharmacies, and chain [employer]-stores. If you have any questions about your diet or nutrition, call the hospital and ask for the dietitian.   Continue regular diet as tolerated

## 2021-11-26 NOTE — FLOWSHEET NOTE
11/25/21 1925   Vital Signs   Temp 98 °F (36.7 °C)   Temp Source Oral   Pulse 61   Heart Rate Source Monitor   Resp 18   /75   BP Location Right upper arm   Patient Position Semi fowlers   Level of Consciousness Alert (0)   MEWS Score 1   Oxygen Therapy   SpO2 96 %   O2 Device None (Room air)   Assessment complete- see flowsheets. Pt resting in bed at this time, call light within reach, bed alarm in place. Pt declined need for PRN medications, denies pain or nausea at this time. Remains pleasantly confused at this time. Will continue to monitor.   Pete Stoll RN

## 2021-11-26 NOTE — PROGRESS NOTES
Patient lying in bed offers no c/o alert oriented to self only pleasant and cooperative denies pain/discomfort eating breakfast IV saline locked to right forearm site unremarkable continues with scattered bruising to right side of body and face

## 2021-11-26 NOTE — PROGRESS NOTES
Patient discharged to [facility] at this time report called over to charge nurse

## 2021-12-02 NOTE — PROGRESS NOTES
Physician Progress Note      PATIENTMurvin Figures  CSN #:                  [id number]  :                       1925  ADMIT DATE:       2021 1:08 PM  Vitor Ybarra [place] DATE:        2021 1:52 PM  RESPONDING  PROVIDER #:        Tj Obando MD          QUERY TEXT:    Pt admitted with pelvic fracture. Pt noted to have osteopenia. If possible,   please document in progress notes and discharge summary if you are evaluating   and/or treating any of the following: The medical record reflects the following:  Risk Factors: fall. osteopenia, advanced age  Clinical Indicators: \"The bones are osteopenic\" and \"osteopenia\" per CT of   pelvis, sacral and pelvic fracture  Treatment: Ortho consult, xray, CT pelvis, supportive care    Thank you,  Lisa Negro RN, CDS  [phone]  Options provided:  -- Pathological pelvic fracture with osteopenia  -- Osteoporotic pelvic fracture following fall which would not usually break a   normal, healthy bone  -- Other - I will add my own diagnosis  -- Disagree - Not applicable / Not valid  -- Disagree - Clinically unable to determine / Unknown  -- Refer to Clinical Documentation Reviewer    PROVIDER RESPONSE TEXT:    This patient has osteopenia which resulted in a pathologic fracture of the   pelvis.     Query created by: Amparo Valera on 2021 2:01 PM      Electronically signed by:  Tj Obando MD 2021 2:31 PM

## 2021-12-08 ENCOUNTER — OFFICE VISIT (OUTPATIENT)
Dept: ORTHOPEDIC SURGERY | Age: 86
End: 2021-12-08
Payer: MEDICARE

## 2021-12-08 ENCOUNTER — TELEPHONE (OUTPATIENT)
Dept: ORTHOPEDIC SURGERY | Age: 86
End: 2021-12-08

## 2021-12-08 VITALS — BODY MASS INDEX: 18.68 KG/M2 | WEIGHT: 119 LBS | HEIGHT: 67 IN

## 2021-12-08 DIAGNOSIS — M16.0 PRIMARY OSTEOARTHRITIS OF BOTH HIPS: ICD-10-CM

## 2021-12-08 DIAGNOSIS — S32.591A CLOSED FRACTURE OF MULTIPLE RAMI OF RIGHT PUBIS, INITIAL ENCOUNTER (HCC): ICD-10-CM

## 2021-12-08 DIAGNOSIS — S32.811A: ICD-10-CM

## 2021-12-08 DIAGNOSIS — S32.811A: Primary | ICD-10-CM

## 2021-12-08 DIAGNOSIS — M25.551 RIGHT HIP PAIN: Primary | ICD-10-CM

## 2021-12-08 PROCEDURE — 1036F TOBACCO NON-USER: CPT | Performed by: PHYSICIAN ASSISTANT

## 2021-12-08 PROCEDURE — G8420 CALC BMI NORM PARAMETERS: HCPCS | Performed by: PHYSICIAN ASSISTANT

## 2021-12-08 PROCEDURE — 99204 OFFICE O/P NEW MOD 45 MIN: CPT | Performed by: PHYSICIAN ASSISTANT

## 2021-12-08 PROCEDURE — 4040F PNEUMOC VAC/ADMIN/RCVD: CPT | Performed by: PHYSICIAN ASSISTANT

## 2021-12-08 PROCEDURE — 1123F ACP DISCUSS/DSCN MKR DOCD: CPT | Performed by: PHYSICIAN ASSISTANT

## 2021-12-08 PROCEDURE — 27197 CLSD TX PELVIC RING FX: CPT | Performed by: PHYSICIAN ASSISTANT

## 2021-12-08 PROCEDURE — G8484 FLU IMMUNIZE NO ADMIN: HCPCS | Performed by: PHYSICIAN ASSISTANT

## 2021-12-08 PROCEDURE — G8427 DOCREV CUR MEDS BY ELIG CLIN: HCPCS | Performed by: PHYSICIAN ASSISTANT

## 2021-12-08 PROCEDURE — 1090F PRES/ABSN URINE INCON ASSESS: CPT | Performed by: PHYSICIAN ASSISTANT

## 2021-12-08 PROCEDURE — 1111F DSCHRG MED/CURRENT MED MERGE: CPT | Performed by: PHYSICIAN ASSISTANT

## 2021-12-08 NOTE — TELEPHONE ENCOUNTER
Medical Facility Question     [facility]  Contact Name: Wendy Abebe Number: [phone]  Request or Information: NEEDS CLARIFICATION FOR CT SCAN ORDER - HIP - LEFT/RIGHT

## 2021-12-08 NOTE — PROGRESS NOTES
Dr Steven Jimenez      Date /Time 12/8/2021       1:50 PM EST  Name Ramesh Mcconnell             1/6/1925   Location  [facility]  MRN <[id number]>                Chief Complaint   Patient presents with    Hip Injury     right hip/pelvis fracture        History of Present Illness    Ramesh Mcconnell is a 80 y.o. female who presents with  bilateral hip pain. Sent in consultation by JANET Sin CNP,. Patient presents to the office today for a new problem. She was seen in the emergency room but did not have an orthopedic consult. She was diagnosed with right superior and inferior pubic rami fracture along with sacral fracture. She was instructed to be nonweightbearing and was transferred to a nursing home. Since being transferred to the nursing home she did fall again. She is here now complaining more of left hip than the right hip pain. Her pain is concentrated over her groin and thigh region. In addition she has sacral pain and less significant right hip pain. Past History  Past Medical History:   Diagnosis Date    HTN (hypertension)      Past Surgical History:   Procedure Laterality Date    HYSTERECTOMY       No family history on file. Social History     Tobacco Use    Smoking status: Never Smoker    Smokeless tobacco: Not on file   Substance Use Topics    Alcohol use: Not on file      Current Outpatient Medications on File Prior to Visit   Medication Sig Dispense Refill    metoprolol succinate (TOPROL XL) 100 MG extended release tablet Take 100 mg by mouth daily       No current facility-administered medications on file prior to visit. ASCVD 10-YEAR RISK SCORE  The ASCVD Risk score ([address] et al., 2013) failed to calculate for the following reasons: The 2013 ASCVD risk score is only valid for ages 36 to 78   . Review of Systems  10-point ROS is negative other than HPI. Physical Exam  Based off 1997 Exam Criteria  There were no vitals taken for this visit. Constitutional:       General: He is not in acute distress. Appearance: Normal appearance. Cardiovascular:      Rate and Rhythm: Normal rate and regular rhythm. Pulses: Normal pulses. Pulmonary:      Effort: Pulmonary effort is normal. No respiratory distress. Neurological:      Mental Status: He is alert and oriented to person, place, and time. Mental status is at baseline.      Musculoskeletal:  Gait:  in wheelchair    Spine / Hip Exam:      RIGHT  LEFT    Lumbar Spine Exam  [x] All Neg    [x] All Neg     Straight leg raise  []  []Not tested   []  []Not tested    Clonus  []  []Not tested   []  []Not tested    Pain with motion  []  []Not tested   []  []Not tested    Radiculopathy  []  []Not tested   []  []Not tested    Paraspinal muscle tenderness  [] Paraspinal  []Midline   [] Paraspinal  []Midline   Sensation RIGHT  LEFT    L3  [x] Normal []Decreased    [x] Normal []Decreased   L4  [x] Normal  []Decreased   [x] Normal []Decreased   L5  [x] Normal []Decreased   [x] Normal []Decreased   S1  [x] Normal  []Decreased   [x] Normal []Decreased   Pelvis       Scoliosis  [x] Nml  [] Present     Leg-length discrepency  [x] Equal  [] Right longer   [] Left longer   Range of Motion Active Passive Active Passive   Hip Flexion 120  120    Abduction 50  50    External Rotation @ 90 flex 65  65    Internal Rotation @ 90 flex 20  20           Hip Impingement / Dysplasia  [] All Neg  [] Not tested   [] All Neg  [] Not tested    Hip impingement test  [x]  []Not tested   [x]  []Not tested    C-sign  [x]  []Not tested   [x]  []Not tested    Anterior instability apprehension  []  []Not tested   []  []Not tested    Posterior instability apprehension  []  []Not tested   []  []Not tested    Uncontained Internal rotation  []  []Not tested  []  []Not tested          Abductors  [] All Neg  [] Not tested   [] All Neg  [] Not tested    Medius strength  []  []Not tested   []  []Not tested    Minimum strength  []  []Not tested []  []Not tested    IT band tendonitis  []  []Not tested   []  []Not tested    Trochanteric tenderness  [x]  []Not tested  [x]  []Not tested   Sciatic neuropathic pain  []  []Not tested   []  []Not tested           Post-arthroplasty  [] All Neg  [] Not tested   [] All Neg  [] Not tested    Rectus tendonitis  []  []Not tested   []  []Not tested    Iliopsoas tendonitis       Start-up pain  []  []Not tested   []  []Not tested          Imaging    Pelvis x-rays x-rays were ordered today and reviewed of the pelvis. 3 views. Standard AP pelvis, inlet, and outlet views. They demonstrate a right acute superior and inferior pubic rami fracture. Sacral fractures are difficult to identify on x-rays. Previous CT scan    EXAMINATION:   CT OF THE PELVIS WITHOUT CONTRAST 11/23/2021 8:32 am       TECHNIQUE:   CT of the pelvis was performed without the administration of intravenous   contrast.  Multiplanar reformatted images are provided for review.    Adjustment of mA and/or kV according to patient size was utilized. Rachel Halsted   modulation, iterative reconstruction, and/or weight based adjustment of the   mA/kV was utilized to reduce the radiation dose to as low as reasonably   achievable.       COMPARISON:   Radiographs dated 11/22/2021       HISTORY:   ORDERING SYSTEM PROVIDED HISTORY: pelvic fx   TECHNOLOGIST PROVIDED HISTORY:   Reason for exam:->pelvic fx   Additional Contrast?->None   Reason for Exam: fell- x-ray of pelvis not clear on fracture       FINDINGS:   The bones are osteopenic.  There is degenerative change at the lower lumbar   spine.  There is an acute mildly comminuted anterior right sacral fracture   involving the sacroiliac joint.  There is a comminuted acute right   parasymphyseal fracture extending to involve the superior pubic ramus.  There   is an additional minimally displaced right inferior pubic ramus fracture with   mild overriding.       There are degenerative features at the symphysis pubis.  No left-sided rami   fractures are appreciated.       Large marginal osteophytes are noted at the left hip femoral head-neck   junction.  Right hip joint space narrowing is noted.  Similar degenerative   features are noted at the left hip.  A left hip fracture is not identified.       Atherosclerosis is noted. Daphane Flies is a small extraperitoneal pelvic hematoma   on the right.  There is a small right pelvic sidewall hematoma involving the   obturator internus muscle.  Mild presacral stranding is noted.       There is a moderate amount of stool in the rectum and colon.           Impression   1. Acute right parasymphyseal fracture extending into the superior pubic   ramus. 2. Acute mildly overriding right inferior pubic ramus fracture. 3. Acute nondisplaced right anterior and lateral sacral fracture. 4. Osteopenia. 5. Small extraperitoneal pelvic hematoma.  Small right pelvic sidewall   hematoma. Procedure:  No orders of the defined types were placed in this encounter. Assessment and Plan  Henry Baeza was seen today for hip injury. Diagnoses and all orders for this visit:    Right hip pain  -     Cancel: XR HIP 2-3 VW W PELVIS RIGHT; Future  -     CT HIP LEFT WO CONTRAST; Future    Mult fx of pelvis w unstable disrupt of pelvic ring, init ([facility])    Closed fracture of multiple rami of right pubis, initial encounter ([facility])    Primary osteoarthritis of both hips        Patient does have an acute pelvic ring fracture on her right. This involves the superior and inferior pubic rami along with the right anterior and lateral sacral fractures. Since the above-mentioned CT scan was performed she did fall again at the nursing home and is having increased pain. Her pain is most significant over the left side which is opposite to where her known fractures are. This concerns me that she may have developed another fracture with her new injury.   She will remain nonweightbearing bilateral lower extremities at this

## 2021-12-08 NOTE — TELEPHONE ENCOUNTER
RC/ CT Pelvis     New order entered     RC to [facility]

## 2021-12-14 ENCOUNTER — HOSPITAL ENCOUNTER (OUTPATIENT)
Dept: CT IMAGING | Age: 86
Discharge: HOME | End: 2021-12-14
Payer: MEDICARE

## 2021-12-14 DIAGNOSIS — S32.811A: ICD-10-CM

## 2021-12-14 PROCEDURE — 72192 CT PELVIS W/O DYE: CPT

## 2021-12-20 ENCOUNTER — TELEPHONE (OUTPATIENT)
Dept: ORTHOPEDIC SURGERY | Age: 86
End: 2021-12-20

## 2021-12-20 NOTE — TELEPHONE ENCOUNTER
I did review patient's new CT scan with Dr. Demarcus Bush.  He reviewed it with Dr. Zoie Keller. They both feel that despite her age with the increase in number of fractures, its instability, and displacement of the fractures surgery may be necessary. We feel it is most appropriate to refer the patient to  for experienced pelvic trauma surgeon.   I have given the patient's daughter the number for you see orthopedics and the name of Dr. Diego Lucia and Dr. Gabriella Arenas
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
## 2022-05-27 ENCOUNTER — HOSPITAL ENCOUNTER (EMERGENCY)
Age: 87
Discharge: HOME | End: 2022-05-27
Payer: MEDICARE

## 2022-05-27 ENCOUNTER — APPOINTMENT (OUTPATIENT)
Dept: GENERAL RADIOLOGY | Age: 87
End: 2022-05-27
Payer: MEDICARE

## 2022-05-27 ENCOUNTER — APPOINTMENT (OUTPATIENT)
Dept: CT IMAGING | Age: 87
End: 2022-05-27
Payer: MEDICARE

## 2022-05-27 VITALS
DIASTOLIC BLOOD PRESSURE: 98 MMHG | TEMPERATURE: 98.3 F | RESPIRATION RATE: 18 BRPM | SYSTOLIC BLOOD PRESSURE: 140 MMHG | HEART RATE: 68 BPM | OXYGEN SATURATION: 100 %

## 2022-05-27 DIAGNOSIS — N39.0 URINARY TRACT INFECTION WITHOUT HEMATURIA, SITE UNSPECIFIED: ICD-10-CM
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
## 2022-05-27 DIAGNOSIS — S09.90XA INJURY OF HEAD, INITIAL ENCOUNTER: ICD-10-CM

## 2022-05-27 LAB
A/G RATIO: 1.5 (ref 1.1–2.2)
ALBUMIN SERPL-MCNC: 4 G/DL (ref 3.4–5)
ALP BLD-CCNC: 85 U/L (ref 40–129)
ALT SERPL-CCNC: 21 U/L (ref 10–40)
ANION GAP SERPL CALCULATED.3IONS-SCNC: 13 MMOL/L (ref 3–16)
AST SERPL-CCNC: 33 U/L (ref 15–37)
BACTERIA: ABNORMAL /HPF
BASOPHILS ABSOLUTE: 0.1 K/UL (ref 0–0.2)
BASOPHILS RELATIVE PERCENT: 0.8 %
BILIRUB SERPL-MCNC: 0.5 MG/DL (ref 0–1)
BILIRUBIN URINE: NEGATIVE
BLOOD, URINE: ABNORMAL
BUN BLDV-MCNC: 20 MG/DL (ref 7–20)
CALCIUM SERPL-MCNC: 9.3 MG/DL (ref 8.3–10.6)
CHLORIDE BLD-SCNC: 99 MMOL/L (ref 99–110)
CLARITY: CLEAR
CO2: 22 MMOL/L (ref 21–32)
COLOR: ABNORMAL
CREAT SERPL-MCNC: 1 MG/DL (ref 0.6–1.2)
EOSINOPHILS ABSOLUTE: 0.1 K/UL (ref 0–0.6)
EOSINOPHILS RELATIVE PERCENT: 1.4 %
EPITHELIAL CELLS, UA: ABNORMAL /HPF (ref 0–5)
GFR AFRICAN AMERICAN: >60
GFR NON-AFRICAN AMERICAN: 51
GLUCOSE BLD-MCNC: 101 MG/DL (ref 70–99)
GLUCOSE URINE: NEGATIVE MG/DL
HCT VFR BLD CALC: 38.4 % (ref 36–48)
HEMOGLOBIN: 12.5 G/DL (ref 12–16)
KETONES, URINE: NEGATIVE MG/DL
LEUKOCYTE ESTERASE, URINE: ABNORMAL
LYMPHOCYTES ABSOLUTE: 1.4 K/UL (ref 1–5.1)
LYMPHOCYTES RELATIVE PERCENT: 22 %
MCH RBC QN AUTO: 31.9 PG (ref 26–34)
MCHC RBC AUTO-ENTMCNC: 32.7 G/DL (ref 31–36)
MCV RBC AUTO: 97.6 FL (ref 80–100)
MICROSCOPIC EXAMINATION: YES
MONOCYTES ABSOLUTE: 0.5 K/UL (ref 0–1.3)
MONOCYTES RELATIVE PERCENT: 8 %
NEUTROPHILS ABSOLUTE: 4.4 K/UL (ref 1.7–7.7)
NEUTROPHILS RELATIVE PERCENT: 67.8 %
NITRITE, URINE: POSITIVE
PDW BLD-RTO: 14.8 % (ref 12.4–15.4)
PH UA: 6 (ref 5–8)
PLATELET # BLD: 232 K/UL (ref 135–450)
PMV BLD AUTO: 7.9 FL (ref 5–10.5)
POTASSIUM REFLEX MAGNESIUM: 4.9 MMOL/L (ref 3.5–5.1)
PROTEIN UA: NEGATIVE MG/DL
RBC # BLD: 3.93 M/UL (ref 4–5.2)
RBC UA: ABNORMAL /HPF (ref 0–4)
SODIUM BLD-SCNC: 134 MMOL/L (ref 136–145)
SPECIFIC GRAVITY UA: 1.01 (ref 1–1.03)
TOTAL PROTEIN: 6.6 G/DL (ref 6.4–8.2)
URINE REFLEX TO CULTURE: YES
URINE TYPE: ABNORMAL
UROBILINOGEN, URINE: 0.2 E.U./DL
WBC # BLD: 6.5 K/UL (ref 4–11)
WBC UA: ABNORMAL /HPF (ref 0–5)

## 2022-05-27 PROCEDURE — 87186 SC STD MICRODIL/AGAR DIL: CPT

## 2022-05-27 PROCEDURE — 6370000000 HC RX 637 (ALT 250 FOR IP): Performed by: PHYSICIAN ASSISTANT

## 2022-05-27 PROCEDURE — 81001 URINALYSIS AUTO W/SCOPE: CPT

## 2022-05-27 PROCEDURE — 80053 COMPREHEN METABOLIC PANEL: CPT

## 2022-05-27 PROCEDURE — 87077 CULTURE AEROBIC IDENTIFY: CPT

## 2022-05-27 PROCEDURE — 70450 CT HEAD/BRAIN W/O DYE: CPT

## 2022-05-27 PROCEDURE — 87086 URINE CULTURE/COLONY COUNT: CPT

## 2022-05-27 PROCEDURE — 71045 X-RAY EXAM CHEST 1 VIEW: CPT

## 2022-05-27 PROCEDURE — 99284 EMERGENCY DEPT VISIT MOD MDM: CPT

## 2022-05-27 PROCEDURE — 85025 COMPLETE CBC W/AUTO DIFF WBC: CPT

## 2022-05-27 PROCEDURE — 72125 CT NECK SPINE W/O DYE: CPT

## 2022-05-27 RX ORDER — ACETAMINOPHEN 325 MG/1
650 TABLET ORAL EVERY 4 HOURS PRN
COMMUNITY

## 2022-05-27 RX ORDER — POLYETHYLENE GLYCOL 3350 17 G/17G
17 POWDER, FOR SOLUTION ORAL DAILY
COMMUNITY

## 2022-05-27 RX ORDER — LOPERAMIDE HYDROCHLORIDE 2 MG/1
2 CAPSULE ORAL 4 TIMES DAILY PRN
COMMUNITY

## 2022-05-27 RX ORDER — CEFDINIR 300 MG/1
300 CAPSULE ORAL ONCE
Status: COMPLETED | OUTPATIENT
Start: 2022-05-27 | End: 2022-05-27

## 2022-05-27 RX ORDER — ACETAMINOPHEN 160 MG
2000 TABLET,DISINTEGRATING ORAL DAILY
COMMUNITY

## 2022-05-27 RX ORDER — CEFDINIR 300 MG/1
300 CAPSULE ORAL 2 TIMES DAILY
Qty: 14 CAPSULE | Refills: 0 | Status: SHIPPED | OUTPATIENT
Start: 2022-05-27 | End: 2022-06-03

## 2022-05-27 RX ORDER — ONDANSETRON 4 MG/1
4 TABLET, FILM COATED ORAL EVERY 6 HOURS PRN
COMMUNITY

## 2022-05-27 RX ADMIN — CEFDINIR 300 MG: 300 CAPSULE ORAL at 18:20

## 2022-05-27 ASSESSMENT — ENCOUNTER SYMPTOMS
COUGH: 0
NAUSEA: 0
FACIAL SWELLING: 1
EYES NEGATIVE: 1
ABDOMINAL PAIN: 0
VOMITING: 0
SHORTNESS OF BREATH: 0
COLOR CHANGE: 1
BACK PAIN: 0

## 2022-05-27 ASSESSMENT — PAIN - FUNCTIONAL ASSESSMENT: PAIN_FUNCTIONAL_ASSESSMENT: NONE - DENIES PAIN

## 2022-05-27 NOTE — ED PROVIDER NOTES
[address] [facility]  ED  EMERGENCY DEPARTMENT ENCOUNTER        Pt Name: Claudell Hotter  MRN: [id number]  Karligfblack 1/6/1925  Date of evaluation: 5/27/2022  Provider: Danii Harvey PA-C  PCP: JANET Calvert CNP  ED Attending: Bertin Canchola MD      This patient was not seen by the attending provider     History provided by the patient    CHIEF COMPLAINT:     Chief Complaint   Patient presents with    Fall     Per EMS, unwitnessed fall from SNF. Patient confused, norm for patient. No blood thinners. Hematoma to right forehead. Patient denies injury. [facility]       HISTORY OF PRESENT ILLNESS:      Claudell Hotter is a 80 y.o. female who arrives to the ED by EMS from [facility] [address] [place] [address]. She has a past medical history that includes hypertension and dementia. She is in the memory care unit. The patient suffered a fall today, an unwitnessed fall. She bumped her head and has a contusion to the forehead. She has a very small skin tear to her right elbow region. She is a poor historian secondary to dementia. However, denies being in pain and has no other complaints. Nursing Notes were reviewed     REVIEW OF SYSTEMS:     Review of Systems   Constitutional: Negative for appetite change, chills and fever. HENT: Positive for facial swelling (forehead contusion). Eyes: Negative. Respiratory: Negative for cough and shortness of breath. Cardiovascular: Negative for chest pain. Gastrointestinal: Negative for abdominal pain, nausea and vomiting. Genitourinary: Negative. Musculoskeletal: Negative for arthralgias, back pain and neck pain. Skin: Positive for color change (forehead contusion) and wound (small skin tear right elbow). Neurological: Negative for headaches. All other systems reviewed and are negative. Except as noted above in the ROS, all other systems were reviewed and negative.          PAST MEDICAL HISTORY:     Past Medical History:   Diagnosis Date    HTN (hypertension)          SURGICAL HISTORY:      Past Surgical History:   Procedure Laterality Date    HYSTERECTOMY           CURRENT MEDICATIONS:       Previous Medications    ACETAMINOPHEN (TYLENOL) 325 MG TABLET    Take 650 mg by mouth every 4 hours as needed for Pain    CHOLECALCIFEROL (VITAMIN D3) 50 MCG (2000 UT) CAPS    Take 2,000 Units by mouth daily    LOPERAMIDE (IMODIUM) 2 MG CAPSULE    Take 2 mg by mouth 4 times daily as needed for Diarrhea    MAGNESIUM HYDROXIDE (MILK OF MAGNESIA) 400 MG/5ML SUSPENSION    Take 30 mLs by mouth daily as needed for Constipation    METOPROLOL SUCCINATE (TOPROL XL) 100 MG EXTENDED RELEASE TABLET    Take 100 mg by mouth daily    ONDANSETRON (ZOFRAN) 4 MG TABLET    Take 4 mg by mouth every 6 hours as needed for Nausea or Vomiting    POLYETHYLENE GLYCOL (GLYCOLAX) 17 G PACKET    Take 17 g by mouth daily         ALLERGIES:    Patient has no known allergies. FAMILY HISTORY:     History reviewed. No pertinent family history. SOCIAL HISTORY:       Social History     Socioeconomic History    Marital status:      Spouse name: None    Number of children: None    Years of education: None    Highest education level: None   Occupational History    None   Tobacco Use    Smoking status: Never Smoker    Smokeless tobacco: Never Used   Substance and Sexual Activity    Alcohol use: Not Currently    Drug use: Never    Sexual activity: None   Other Topics Concern    None   Social History Narrative    None     Social Determinants of Health     Financial Resource Strain:     Difficulty of Paying Living Expenses: Not on file   Food Insecurity:     Worried About Running Out of Food in the Last Year: Not on file    Lan of Food in the Last Year: Not on file   Transportation Needs:     Lack of Transportation (Medical): Not on file    Lack of Transportation (Non-Medical):  Not on file   Physical Activity:     Days of Exercise per Week: Not on file    Minutes of Exercise per Session: Not on file   Stress:     Feeling of Stress : Not on file   Social Connections:     Frequency of Communication with Friends and Family: Not on file    Frequency of Social Gatherings with Friends and Family: Not on file    Attends Latter-day Services: Not on file    Active Member of Clubs or Organizations: Not on file    Attends Club or Organization Meetings: Not on file    Marital Status: Not on file   Intimate Partner Violence:     Fear of Current or Ex-Partner: Not on file    Emotionally Abused: Not on file    Physically Abused: Not on file    Sexually Abused: Not on file   Housing Stability:     Unable to Pay for Housing in the Last Year: Not on file    Number of L[place] in the Last Year: Not on file    Unstable Housing in the Last Year: Not on file       SCREENINGS:    Calli Coma Scale  Eye Opening: Spontaneous  Best Verbal Response: Confused  Best Motor Response: Obeys commands  Hamilton Coma Scale Score: 14        PHYSICAL EXAM:       ED Triage Vitals [05/27/22 1635]   BP Temp Temp Source Heart Rate Resp SpO2 Height Weight   (!) 167/83 98.3 °F (36.8 °C) Oral 62 16 100 % -- --       Physical Exam    CONSTITUTIONAL: Awake and alert. Cooperative. Well-developed. Well-nourished. Non-toxic. No acute distress. HENT: Normocephalic. Forehead contusion. External ears normal, without discharge. No nasal discharge. Oropharynx clear. Mucous membranes moist.  EYES: Conjunctiva non-injected. No scleral icterus. PERRL. EOM's grossly intact. NECK: Supple. Normal ROM. CARDIOVASCULAR: RRR. No Murmer. Intact distal pulses. PULMONARY/CHEST WALL: Effort normal. No tachypnea. Lungs clear to ausculation. ABDOMEN: Normal BS. Soft. Nondistended. No tenderness to palpate. No guarding. /ANORECTAL: Not assessed  MUSKULOSKELETAL: Normal ROM. No acute deformities. No edema. No tenderness to palpate. SKIN: Warm and dry. No rash. Small skin tear right elbow.   NEUROLOGICAL: Alert and oriented to person only. Chronic dementia. GCS 15. CN II-XII grossly intact. Strength is 5/5 in all extremities and sensation is intact.     PSYCHIATRIC: Normal affect        DIAGNOSTICRESULTS:     LABS:    Results for orders placed or performed during the hospital encounter of 05/27/22   CBC with Auto Differential   Result Value Ref Range    WBC 6.5 4.0 - 11.0 K/uL    RBC 3.93 (L) 4.00 - 5.20 M/uL    Hemoglobin 12.5 12.0 - 16.0 g/dL    Hematocrit 38.4 36.0 - 48.0 %    MCV 97.6 80.0 - 100.0 fL    MCH 31.9 26.0 - 34.0 pg    MCHC 32.7 31.0 - 36.0 g/dL    RDW 14.8 12.4 - 15.4 %    Platelets 187 140 - 353 K/uL    MPV 7.9 5.0 - 10.5 fL    Neutrophils % 67.8 %    Lymphocytes % 22.0 %    Monocytes % 8.0 %    Eosinophils % 1.4 %    Basophils % 0.8 %    Neutrophils Absolute 4.4 1.7 - 7.7 K/uL    Lymphocytes Absolute 1.4 1.0 - 5.1 K/uL    Monocytes Absolute 0.5 0.0 - 1.3 K/uL    Eosinophils Absolute 0.1 0.0 - 0.6 K/uL    Basophils Absolute 0.1 0.0 - 0.2 K/uL   Comprehensive Metabolic Panel w/ Reflex to MG   Result Value Ref Range    Sodium 134 (L) 136 - 145 mmol/L    Potassium reflex Magnesium 4.9 3.5 - 5.1 mmol/L    Chloride 99 99 - 110 mmol/L    CO2 22 21 - 32 mmol/L    Anion Gap 13 3 - 16    Glucose 101 (H) 70 - 99 mg/dL    BUN 20 7 - 20 mg/dL    CREATININE 1.0 0.6 - 1.2 mg/dL    GFR Non- 51 (A) >60    GFR African American >60 >60    Calcium 9.3 8.3 - 10.6 mg/dL    Total Protein 6.6 6.4 - 8.2 g/dL    Albumin 4.0 3.4 - 5.0 g/dL    Albumin/Globulin Ratio 1.5 1.1 - 2.2    Total Bilirubin 0.5 0.0 - 1.0 mg/dL    Alkaline Phosphatase 85 40 - 129 U/L    ALT 21 10 - 40 U/L    AST 33 15 - 37 U/L   Urinalysis with Reflex to Culture   Result Value Ref Range    Color, UA Straw Straw/Yellow    Clarity, UA Clear Clear    Glucose, Ur Negative Negative mg/dL    Bilirubin Urine Negative Negative    Ketones, Urine Negative Negative mg/dL    Specific Gravity, UA 1.010 1.005 - 1.030    Blood, Urine TRACE-INTACT (A) Negative pH, UA 6.0 5.0 - 8.0    Protein, UA Negative Negative mg/dL    Urobilinogen, Urine 0.2 <2.0 E.U./dL    Nitrite, Urine POSITIVE (A) Negative    Leukocyte Esterase, Urine SMALL (A) Negative    Microscopic Examination YES     Urine Type NotGiven     Urine Reflex to Culture Yes    Microscopic Urinalysis   Result Value Ref Range    WBC, UA  (A) 0 - 5 /HPF    RBC, UA 3-4 0 - 4 /HPF    Epithelial Cells, UA 2-5 0 - 5 /HPF    Bacteria, UA 4+ (A) None Seen /HPF         RADIOLOGY:  All x-ray studies areviewed/reviewed by me. Formal interpretations per the radiologist are as follows:      CT Head WO Contrast    Result Date: 5/27/2022  EXAMINATION: CT OF THE HEAD WITHOUT CONTRAST  5/27/2022 4:59 pm TECHNIQUE: CT of the head was performed without the administration of intravenous contrast. Automated exposure control, iterative reconstruction, and/or weight based adjustment of the mA/kV was utilized to reduce the radiation dose to as low as reasonably achievable. COMPARISON: 11/22/2021 CT head HISTORY: ORDERING SYSTEM PROVIDED HISTORY: fall, head injury TECHNOLOGIST PROVIDED HISTORY: Reason for exam:->fall, head injury Has a \"code stroke\" or \"stroke alert\" been called? ->No Decision Support Exception - unselect if not a suspected or confirmed emergency medical condition->Emergency Medical Condition (MA) FINDINGS: BRAIN/VENTRICLES: There is no acute intracranial hemorrhage, mass effect or midline shift. No abnormal extra-axial fluid collection. The gray-white differentiation is maintained without evidence of an acute infarct. There is no evidence of hydrocephalus. There are nonspecific areas of hypoattenuation within the periventricular and subcortical white matter, which likely represent chronic microvascular ischemic change. There is prominence of the ventricles and sulci due to global parenchymal volume loss. ORBITS: The visualized portion of the orbits demonstrate no acute abnormality.  SINUSES: The visualized paranasal sinuses and mastoid air cells demonstrate no acute abnormality. SOFT TISSUES/SKULL:  A small hematoma overlies the right frontal bone without evidence of underlying osseous abnormality. A small hematoma overlies the right frontal bone without evidence of underlying osseous or acute intracranial abnormality. CT Cervical Spine WO Contrast    Result Date: 5/27/2022  EXAMINATION: CT OF THE CERVICAL SPINE WITHOUT CONTRAST 5/27/2022 4:59 pm TECHNIQUE: CT of the cervical spine was performed without the administration of intravenous contrast. Multiplanar reformatted images are provided for review. Automated exposure control, iterative reconstruction, and/or weight based adjustment of the mA/kV was utilized to reduce the radiation dose to as low as reasonably achievable. COMPARISON: 11/22/2021 CT cervical spine HISTORY: ORDERING SYSTEM PROVIDED HISTORY: fall, head injury TECHNOLOGIST PROVIDED HISTORY: Reason for exam:->fall, head injury Decision Support Exception - unselect if not a suspected or confirmed emergency medical condition->Emergency Medical Condition (MA) Reason for Exam: Fall, heady injury, Bruise to right forehead FINDINGS: BONES/ALIGNMENT: There is no acute fracture. Minimally increased anterolisthesis of C3 on C4 may be related to facet arthropathy or patient positioning. T2 vertebral body hemangioma. DEGENERATIVE CHANGES: Multilevel degenerative changes of the cervical spine including moderate degenerative disc disease at C4-C5, C5-C6, and C6-C7. Bilateral facet arthropathy. SOFT TISSUES: There is no prevertebral soft tissue swelling. Biapical pleural-parenchymal scarring. 1.  No evidence of acute fracture involving the cervical spine. 2.  There is minimally increased anterolisthesis of C3 on C4, which may relate to progressive facet arthropathy or patient positioning. XR CHEST PORTABLE    Result Date: 5/27/2022  EXAMINATION: ONE XRAY VIEW OF THE CHEST 5/27/2022 4:49 pm COMPARISON: None. HISTORY: ORDERING SYSTEM PROVIDED HISTORY: fall TECHNOLOGIST PROVIDED HISTORY: Reason for exam:->fall Reason for Exam: Fall FINDINGS: Left-sided ICD. Mild cardiomegaly. No pneumothorax or pleural effusion. No acute airspace infiltrate. No acute cardiopulmonary findings. PROCEDURES:   N/A    CRITICAL CARE TIME:       None      CONSULTS:  None      EMERGENCY DEPARTMENT COURSE and DIFFERENTIAL DIAGNOSIS/MDM:   Vitals:    Vitals:    05/27/22 1635 05/27/22 1700 05/27/22 1730 05/27/22 1800   BP: (!) 167/83 (!) 167/83 (!) 170/85 (!) 140/98   Pulse: 62 65 68 68   Resp: 16 18 16 18   Temp: 98.3 °F (36.8 °C)      TempSrc: Oral      SpO2: 100% 98% 97% 100%       Patient was given the following medications:  Medications   cefdinir (OMNICEF) capsule 300 mg (300 mg Oral Given 5/27/22 1820)         I have evaluated this patient in the ED. Old records were reviewed. Patient arrives from an assisted living facility, the memory care unit. She has dementia but very few other issues. She had a fall today and hit her head. She has baseline dementia and is oriented only to person. She has no other injury and moves all extremities well. Patient's daughter is at bedside with her. I made her aware of the plan including screening labs, urine, head and neck CTs as well as chest x-ray. Daughter would like her to go back to facility if all is clear. CBC white count 6.5 with H&H 12.5 and 38.4  CMP unremarkable  Urinalysis shows nitrate positive urine with small leukocytes,  WBCs and 4+ bacteria. Culture sent. Patient given Omnicef 300 mg orally for UTI. CT head shows a small hematoma overlying the right frontal bone without evidence of underlying osseous or acute intracranial normality  CT C-spine shows no evidence of acute fracture involving the C-spine.   Minimally increased anterolisthesis at C3 on C4 which may relate to progressive facet arthropathy pathway or patient positioning  Portable chest x-ray normal  Patient has remained hemodynamically stable here in the ED. Ultimately, she will be discharged back to the facility. Daughter will drive her. Patient will be sent home with a prescription for Cefdinir. I estimate there is LOW risk for SEPSIS, ACUTE CORONARY SYNDROME, MALIGNANT DYSRHYTHMIA or HYPERTENSION, PULMONARY EMBOLISM, THORACIC AORTIC DISSECTION, INTRACRANIAL HEMORRHAGE, SUBARACHNOID HEMORRHAGE, SUBDURAL HEMATOMA or STROKE, thus I consider the discharge disposition reasonable. Lashae Scott and I have discussed the diagnosis and risks, and we agree with discharging home to follow-up with their primary doctor. We also discussed returning to the Emergency Department immediately if new or worsening symptoms occur. We have discussed the symptoms which are most concerning (e.g., fever, bloody sputum, worsening pain or shortness of breath, weakness, persistent vomiting) that necessitate immediate return. FINAL IMPRESSION:      1. Fall, initial encounter    2. Injury of head, initial encounter    3.  Urinary tract infection without hematuria, site unspecified          DISPOSITION/PLAN:   DISPOSITION Decision To Discharge      PATIENT REFERRED TO:  Herberth Omer, JANET [facility]  [id number] [place] [address]  [phone]            DISCHARGE MEDICATIONS:  New Prescriptions    CEFDINIR (OMNICEF) 300 MG CAPSULE    Take 1 capsule by mouth 2 times daily for 7 days                  (Please note thatportions of this note were completed with a voice recognition program.  Efforts were made to edit the dictations, but occasionally words are mis-transcribed.)    Debora Marquez PA-C (electronicallysigned)              Claudene Rose, [place]  05/27/22 1931

## 2022-05-29 LAB
ORGANISM: ABNORMAL
URINE CULTURE, ROUTINE: ABNORMAL

## 2022-08-27 ENCOUNTER — APPOINTMENT (OUTPATIENT)
Dept: GENERAL RADIOLOGY | Age: 87
End: 2022-08-27
Payer: MEDICARE

## 2022-08-27 ENCOUNTER — HOSPITAL ENCOUNTER (EMERGENCY)
Age: 87
Discharge: HOME | End: 2022-08-27
Attending: STUDENT IN AN ORGANIZED HEALTH CARE EDUCATION/TRAINING PROGRAM
Payer: MEDICARE

## 2022-08-27 ENCOUNTER — APPOINTMENT (OUTPATIENT)
Dept: CT IMAGING | Age: 87
End: 2022-08-27
Payer: MEDICARE

## 2022-08-27 VITALS
RESPIRATION RATE: 16 BRPM | DIASTOLIC BLOOD PRESSURE: 69 MMHG | OXYGEN SATURATION: 96 % | TEMPERATURE: 97.7 F | SYSTOLIC BLOOD PRESSURE: 154 MMHG | HEART RATE: 66 BPM
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
## 2022-08-27 LAB
A/G RATIO: 1.8 (ref 1.1–2.2)
ALBUMIN SERPL-MCNC: 4 G/DL (ref 3.4–5)
ALP BLD-CCNC: 76 U/L (ref 40–129)
ALT SERPL-CCNC: 74 U/L (ref 10–40)
ANION GAP SERPL CALCULATED.3IONS-SCNC: 11 MMOL/L (ref 3–16)
AST SERPL-CCNC: 38 U/L (ref 15–37)
BASOPHILS ABSOLUTE: 0 K/UL (ref 0–0.2)
BASOPHILS RELATIVE PERCENT: 0.6 %
BILIRUB SERPL-MCNC: 1 MG/DL (ref 0–1)
BUN BLDV-MCNC: 20 MG/DL (ref 7–20)
CALCIUM SERPL-MCNC: 9.5 MG/DL (ref 8.3–10.6)
CHLORIDE BLD-SCNC: 100 MMOL/L (ref 99–110)
CO2: 26 MMOL/L (ref 21–32)
CREAT SERPL-MCNC: 0.9 MG/DL (ref 0.6–1.2)
EOSINOPHILS ABSOLUTE: 0 K/UL (ref 0–0.6)
EOSINOPHILS RELATIVE PERCENT: 0.5 %
GFR AFRICAN AMERICAN: >60
GFR NON-AFRICAN AMERICAN: 58
GLUCOSE BLD-MCNC: 113 MG/DL (ref 70–99)
HCT VFR BLD CALC: 38 % (ref 36–48)
HEMOGLOBIN: 12.7 G/DL (ref 12–16)
LYMPHOCYTES ABSOLUTE: 1.1 K/UL (ref 1–5.1)
LYMPHOCYTES RELATIVE PERCENT: 15.6 %
MCH RBC QN AUTO: 32.5 PG (ref 26–34)
MCHC RBC AUTO-ENTMCNC: 33.4 G/DL (ref 31–36)
MCV RBC AUTO: 97.3 FL (ref 80–100)
MONOCYTES ABSOLUTE: 0.5 K/UL (ref 0–1.3)
MONOCYTES RELATIVE PERCENT: 6.5 %
NEUTROPHILS ABSOLUTE: 5.4 K/UL (ref 1.7–7.7)
NEUTROPHILS RELATIVE PERCENT: 76.8 %
PDW BLD-RTO: 14.7 % (ref 12.4–15.4)
PLATELET # BLD: 201 K/UL (ref 135–450)
PMV BLD AUTO: 8.6 FL (ref 5–10.5)
POTASSIUM REFLEX MAGNESIUM: 4.5 MMOL/L (ref 3.5–5.1)
RBC # BLD: 3.91 M/UL (ref 4–5.2)
SODIUM BLD-SCNC: 137 MMOL/L (ref 136–145)
TOTAL PROTEIN: 6.2 G/DL (ref 6.4–8.2)
WBC # BLD: 7.1 K/UL (ref 4–11)

## 2022-08-27 PROCEDURE — 74177 CT ABD & PELVIS W/CONTRAST: CPT

## 2022-08-27 PROCEDURE — 72125 CT NECK SPINE W/O DYE: CPT

## 2022-08-27 PROCEDURE — 80053 COMPREHEN METABOLIC PANEL: CPT

## 2022-08-27 PROCEDURE — 73130 X-RAY EXAM OF HAND: CPT

## 2022-08-27 PROCEDURE — 90715 TDAP VACCINE 7 YRS/> IM: CPT | Performed by: STUDENT IN AN ORGANIZED HEALTH CARE EDUCATION/TRAINING PROGRAM

## 2022-08-27 PROCEDURE — 90471 IMMUNIZATION ADMIN: CPT | Performed by: STUDENT IN AN ORGANIZED HEALTH CARE EDUCATION/TRAINING PROGRAM

## 2022-08-27 PROCEDURE — 71046 X-RAY EXAM CHEST 2 VIEWS: CPT

## 2022-08-27 PROCEDURE — 85025 COMPLETE CBC W/AUTO DIFF WBC: CPT

## 2022-08-27 PROCEDURE — 99285 EMERGENCY DEPT VISIT HI MDM: CPT

## 2022-08-27 PROCEDURE — 6360000004 HC RX CONTRAST MEDICATION: Performed by: STUDENT IN AN ORGANIZED HEALTH CARE EDUCATION/TRAINING PROGRAM

## 2022-08-27 PROCEDURE — 70450 CT HEAD/BRAIN W/O DYE: CPT

## 2022-08-27 PROCEDURE — 6360000002 HC RX W HCPCS: Performed by: STUDENT IN AN ORGANIZED HEALTH CARE EDUCATION/TRAINING PROGRAM

## 2022-08-27 RX ORDER — M-VIT,TX,IRON,MINS/CALC/FOLIC 27MG-0.4MG
1 TABLET ORAL DAILY
COMMUNITY

## 2022-08-27 RX ORDER — ZINC OXIDE AND DIMETHICONE 120; 10 MG/G; MG/G
CREAM TOPICAL 2 TIMES DAILY PRN
COMMUNITY

## 2022-08-27 RX ADMIN — TETANUS TOXOID, REDUCED DIPHTHERIA TOXOID AND ACELLULAR PERTUSSIS VACCINE, ADSORBED 0.5 ML: 5; 2.5; 8; 8; 2.5 SUSPENSION INTRAMUSCULAR at 10:56

## 2022-08-27 RX ADMIN — IOPAMIDOL 75 ML: 755 INJECTION, SOLUTION INTRAVENOUS at 11:53

## 2022-08-27 ASSESSMENT — PAIN SCALES - WONG BAKER: WONGBAKER_NUMERICALRESPONSE: 2

## 2022-08-27 ASSESSMENT — PAIN - FUNCTIONAL ASSESSMENT: PAIN_FUNCTIONAL_ASSESSMENT: WONG-BAKER FACES

## 2022-08-27 NOTE — ED NOTES
Pt returned from CT at this time. Family at bedside updated on plan of care.       Jeanette Felix RN  08/27/22 0882

## 2022-08-27 NOTE — DISCHARGE INSTRUCTIONS
Follow-up with primary care physician on Monday for reevaluation. Patient does have skin tears on her hand. Would like daily dressing changes with monitoring for infection. Would like daily dressing changes at nursing facility with monitoring for infection. X-rays of the bilateral hands were obtained if patient begins having pain in any other region of her extremities please return to the emergency department for further evaluation and treatment. Patient did have some fluid in her pelvis which is likely physiologic, normal for patient, I did discuss with radiologist and states is unlikely to be blood looks to be like normal fluid. I discussed with son but I would like patient to be closely evaluated for increased abdominal pain, changes in mental status or any new change or worsening symptoms please return to the ED for further evaluation and treatment.

## 2022-09-03 NOTE — ED PROVIDER NOTES
Emergency Department Encounter    Patient: Gris Craig  MRN: [id number]  : 1925  Date of Evaluation: 9/3/2022  ED Provider:  Eric Farrar MD    Triage Chief Complaint:   Fall (Witnessed mechanical fall with walker. Kerry Faes on right side. No loc, no head injury. Skin tear to right hand, abrasion to left hand. EMS reports abrasion to right hip. Pt denies pain in hips. )    [place]:  Gris Craig is a 80 y.o. female with history seen below presenting from unwitnessed fall from nursing facility. Per report patient was using her walker and tripped over her feet. Patient fell onto her right side. Patient did hit her right hand and has a skin tear over her right hand. Patient did not hit her head with no loss of consciousness. No antiplatelets or anticoagulation. Patient does have history of dementia and history is poor from patient. Patient denies any headache, blurred vision, motor or sensory changes. Denies any neck or back pain. Denies chest pain or shortness of breath. Denies abdominal pain but does have some very mild discomfort over the left flank left lower quadrant on deep palpation. Patient does describe some mild pain in her bilateral hands. ROS - see HPI, below listed is current ROS at time of my eval:  At least 14 systems reviewed, negative other HPI    Past Medical History:   Diagnosis Date    HTN (hypertension)      Past Surgical History:   Procedure Laterality Date    HYSTERECTOMY (CERVIX STATUS UNKNOWN)       History reviewed. No pertinent family history. Social History     Socioeconomic History    Marital status:       Spouse name: Not on file    Number of children: Not on file    Years of education: Not on file    Highest education level: Not on file   Occupational History    Not on file   Tobacco Use    Smoking status: Never    Smokeless tobacco: Never   Substance and Sexual Activity    Alcohol use: Not Currently    Drug use: Never    Sexual activity: Not on file Other Topics Concern    Not on file   Social History Narrative    Not on file     Social Determinants of Health     Financial Resource Strain: Not on file   Food Insecurity: Not on file   Transportation Needs: Not on file   Physical Activity: Not on file   Stress: Not on file   Social Connections: Not on file   Intimate Partner Violence: Not on file   Housing Stability: Not on file     No current facility-administered medications for this encounter. Current Outpatient Medications   Medication Sig Dispense Refill    dimethicone-zinc oxide (KAREN PROTECT) cream Apply topically 2 times daily as needed for Dry Skin Apply topically 2 times daily as needed. Multiple Vitamins-Minerals (THERAPEUTIC MULTIVITAMIN-MINERALS) tablet Take 1 tablet by mouth daily      acetaminophen (TYLENOL) 325 mg tablet Take 650 mg by mouth every 4 hours as needed for Pain      Cholecalciferol (VITAMIN D3) 50 MCG (2000 UT) CAPS Take 2,000 Units by mouth daily      loperamide (IMODIUM) 2 MG capsule Take 2 mg by mouth 4 times daily as needed for Diarrhea      magnesium hydroxide (MILK OF MAGNESIA) 400 MG/5ML suspension Take 30 mLs by mouth daily as needed for Constipation      ondansetron (ZOFRAN) 4 MG tablet Take 4 mg by mouth every 6 hours as needed for Nausea or Vomiting      polyethylene glycol (GLYCOLAX) 17 g packet Take 17 g by mouth daily      metoprolol succinate (TOPROL XL) 100 MG extended release tablet Take 100 mg by mouth daily       No Known Allergies    Nursing Notes Reviewed    Physical Exam:  Triage VS:    ED Triage Vitals [08/27/22 1039]   Enc Vitals Group      /74      Heart Rate 65      Resp 16      Temp 97.7 °F (36.5 °C)      Temp Source Oral      SpO2 96 %      Weight       Height       Head Circumference       Peak Flow       Pain Score       Pain Loc       Pain Edu? Excl. in [address]? My pulse ox interpretation is - normal    General appearance:  No acute distress. Skin:  Warm. Dry.    Eye: Extraocular movements intact. Ears, nose, mouth and throat:  Oral mucosa moist, TMs clear, no hemotympanum, no signs of CSF leak, no septal hematoma, no tenderness palpation of the face, oropharynx is clear, no raccoon eyes or brasher signs or signs of basilar skull fracture  Neck:  Trachea midline. No tenderness palpation of the CT or L-spine  Extremity:  No swelling. Normal ROM patient does have a skin tear over the right hand as well as a minor abrasion over the left hand patient has no significant tenderness palpation in this region, no scaphoid tenderness, normal FDS and FDP functioning and extensor function of all digits, normal range of motion of the elbow as well as the wrist, 2+ distal pulses, less than 2-second cap refill  Heart:  Regular rate and rhythm, normal S1 & S2, no extra heart sounds. Perfusion:  intact  Respiratory:  Lungs clear to auscultation bilaterally. Respirations nonlabored. Abdominal:  Normal bowel sounds. Soft. Very slight discomfort in the left lower quadrant with deep palpation without rebound or guarding, no CVA tenderness, no central spinal tenderness on reevaluation patient has no pain in this region  Back:  No CVA tenderness to palpation     Neurological:  Alert and oriented to person and intermittently to place. No focal neuro deficits.    No facial asymmetry, normal sensation light touch of the face, extraocular eye movements are intact, pupils are 3 mm reactive bilaterally, no pronator drift of bilateral upper and lower extremities, normal sensation light touch of bilateral upper and lower extremities, normal finger-nose-finger and heel shin, no dysarthria dysphagia          Psychiatric:  Appropriate    I have reviewed and interpreted all of the currently available lab results from this visit (if applicable):  Results for orders placed or performed during the hospital encounter of 08/27/22   CBC with Auto Differential   Result Value Ref Range    WBC 7.1 4.0 - 11.0 K/uL    RBC 3.91 (L) 4.00 - 5.20 M/uL    Hemoglobin 12.7 12.0 - 16.0 g/dL    Hematocrit 38.0 36.0 - 48.0 %    MCV 97.3 80.0 - 100.0 fL    MCH 32.5 26.0 - 34.0 pg    MCHC 33.4 31.0 - 36.0 g/dL    RDW 14.7 12.4 - 15.4 %    Platelets 024 740 - 925 K/uL    MPV 8.6 5.0 - 10.5 fL    Neutrophils % 76.8 %    Lymphocytes % 15.6 %    Monocytes % 6.5 %    Eosinophils % 0.5 %    Basophils % 0.6 %    Neutrophils Absolute 5.4 1.7 - 7.7 K/uL    Lymphocytes Absolute 1.1 1.0 - 5.1 K/uL    Monocytes Absolute 0.5 0.0 - 1.3 K/uL    Eosinophils Absolute 0.0 0.0 - 0.6 K/uL    Basophils Absolute 0.0 0.0 - 0.2 K/uL   Comprehensive Metabolic Panel w/ Reflex to MG   Result Value Ref Range    Sodium 137 136 - 145 mmol/L    Potassium reflex Magnesium 4.5 3.5 - 5.1 mmol/L    Chloride 100 99 - 110 mmol/L    CO2 26 21 - 32 mmol/L    Anion Gap 11 3 - 16    Glucose 113 (H) 70 - 99 mg/dL    BUN 20 7 - 20 mg/dL    Creatinine 0.9 0.6 - 1.2 mg/dL    GFR Non-African American 58 (A) >60    GFR African American >60 >60    Calcium 9.5 8.3 - 10.6 mg/dL    Total Protein 6.2 (L) 6.4 - 8.2 g/dL    Albumin 4.0 3.4 - 5.0 g/dL    Albumin/Globulin Ratio 1.8 1.1 - 2.2    Total Bilirubin 1.0 0.0 - 1.0 mg/dL    Alkaline Phosphatase 76 40 - 129 U/L    ALT 74 (H) 10 - 40 U/L    AST 38 (H) 15 - 37 U/L      Radiographs (if obtained):  Radiologist's Report Reviewed:  XR CHEST (2 VW)    Result Date: 8/27/2022  EXAMINATION: TWO XRAY VIEWS OF THE CHEST 8/27/2022 10:53 am COMPARISON: Chest x-ray dated 05/27/2022 HISTORY: ORDERING SYSTEM PROVIDED HISTORY: fall, no chest pain TECHNOLOGIST PROVIDED HISTORY: Reason for exam:->fall, no chest pain Reason for Exam: fall, no chest pain FINDINGS: Small bilateral pleural effusions with adjacent atelectasis. No pneumothorax. Cardiac silhouette is enlarged. Degenerative disease of the visualized osseous structures. Left-sided cardiac device with leads in the right atrium and right ventricle.      Small bilateral pleural effusions with adjacent atelectasis. XR HAND LEFT (MIN 3 VIEWS)    Result Date: 8/27/2022  EXAMINATION: 4 XRAY VIEWS OF THE RIGHT HAND; THREE XRAY VIEWS OF THE LEFT HAND 8/27/2022 10:53 am COMPARISON: None. HISTORY: ORDERING SYSTEM PROVIDED HISTORY: pain after fall TECHNOLOGIST PROVIDED HISTORY: Reason for exam:->pain after fall Reason for Exam: pain after fall, laceration FINDINGS: No acute fractures are identified. There is bilateral severe mandeep trapezial osseous hypertrophy. Left ulna styloid is irregular, consistent with chronic ununited fracture. There is calcification triangular fibrocartilage complexes bilaterally. There is generalized joint space narrowing and osseous hypertrophy of the MCP and IP joints bilaterally. Process is more severe in the interphalangeal joints, with more severe joint space narrowing and components of central osteophytes. There are exuberant osteophytes of the 2nd through 5th PIP joints. There are ulnar subluxations of the 3rd PIP is bilaterally, greater on the left. No periarticular erosions are seen. No acute osseous injuries are identified. Severe polyarticular osteoarthritis. CPPD-related arthropathy also considered. XR HAND RIGHT (MIN 3 VIEWS)    Result Date: 8/27/2022  EXAMINATION: 4 XRAY VIEWS OF THE RIGHT HAND; THREE XRAY VIEWS OF THE LEFT HAND 8/27/2022 10:53 am COMPARISON: None. HISTORY: ORDERING SYSTEM PROVIDED HISTORY: pain after fall TECHNOLOGIST PROVIDED HISTORY: Reason for exam:->pain after fall Reason for Exam: pain after fall, laceration FINDINGS: No acute fractures are identified. There is bilateral severe mandeep trapezial osseous hypertrophy. Left ulna styloid is irregular, consistent with chronic ununited fracture. There is calcification triangular fibrocartilage complexes bilaterally. There is generalized joint space narrowing and osseous hypertrophy of the MCP and IP joints bilaterally.   Process is more severe in the interphalangeal joints, with more severe abnormality. SOFT TISSUES/SKULL: No acute abnormality of the visualized skull or soft tissues. No acute intracranial abnormality. CT CERVICAL SPINE WO CONTRAST    Result Date: 8/27/2022  EXAMINATION: CT OF THE CERVICAL SPINE WITHOUT CONTRAST 8/27/2022 11:30 am TECHNIQUE: CT of the cervical spine was performed without the administration of intravenous contrast. Multiplanar reformatted images are provided for review. Automated exposure control, iterative reconstruction, and/or weight based adjustment of the mA/kV was utilized to reduce the radiation dose to as low as reasonably achievable. COMPARISON: CT cervical spine dated 05/27/2022 HISTORY: ORDERING SYSTEM PROVIDED HISTORY: fall TECHNOLOGIST PROVIDED HISTORY: Reason for exam:->fall Decision Support Exception - unselect if not a suspected or confirmed emergency medical condition->Emergency Medical Condition (MA) Reason for Exam: Fall (Witnessed mechanical fall with walker. Miesha Myrick on right side. No loc, no head injury. Skin tear to right hand, abrasion to left hand. EMS reports abrasion to right hip. Pt denies pain in hips. ) FINDINGS: BONES/ALIGNMENT: Osteopenia of the visualized osseous structures. Grade 1 anterolisthesis of C3 on C4, stable compared to prior study. No acute fracture or traumatic malalignment. DEGENERATIVE CHANGES: Multilevel degenerative changes. No central canal stenosis. SOFT TISSUES: There is no prevertebral soft tissue swelling. No acute abnormality of the cervical spine. CT ABDOMEN PELVIS W IV CONTRAST Additional Contrast? None    Result Date: 8/27/2022  EXAMINATION: CT OF THE ABDOMEN AND PELVIS WITH CONTRAST 8/27/2022 11:47 am TECHNIQUE: CT of the abdomen and pelvis was performed with the administration of intravenous contrast. Multiplanar reformatted images are provided for review.  Automated exposure control, iterative reconstruction, and/or weight based adjustment of the mA/kV was utilized to reduce the radiation dose to as low as reasonably achievable. COMPARISON: CT pelvis 12/14/2021 HISTORY: ORDERING SYSTEM PROVIDED HISTORY: vague discomfort in the llq after a fall TECHNOLOGIST PROVIDED HISTORY: Reason for exam:->vague discomfort in the llq after a fall Additional Contrast?->None Decision Support Exception - unselect if not a suspected or confirmed emergency medical condition->Emergency Medical Condition (MA) Reason for Exam: Fall (Witnessed mechanical fall with walker. Mireya Libel on right side. No loc, no head injury. Skin tear to right hand, abrasion to left hand. EMS reports abrasion to right hip. Pt denies pain in hips. ) Additional signs and symptoms: vague discomfort in the llq after a fall FINDINGS: Lower Chest: Stimulator wires are in the right atrium and ventricle. There is moderate cardiomegaly. Small pleural effusions and interlobular septal opacities are noted. Organs: Cysts are noted in the right lobe of the liver measuring up to 6 mm; no follow-up is recommended. No gallbladder stones are seen. The pancreas and spleen are unremarkable. There is a 21 mm indeterminate left adrenal nodule. The kidneys enhance symmetrically. There is no hydronephrosis. GI/Bowel: The stomach is in a collapsed state. No dilated loops of small bowel or colon are identified. There is no focal mural thickening. Pelvis: Urinary bladder is unremarkable. A small amount of free fluid is in the pelvis. Peritoneum/Retroperitoneum: Moderate aortoiliac calcification, without aneurysm. No adenopathy. Bones/Soft Tissues: L4-5 grade 1 degenerative spondylolisthesis. Deformity at S1-2, consistent with remote injury. There is moderate osteoarthritis of both hips. There are old fractures of the right superior and inferior pubic rami and left ischial tuberosity. No acute osseous abnormality. Abdominal wall is unremarkable. 1. Small amount of free fluid in the pelvis, nonspecific, within the physiological range.  2. No acute abdominopelvic abnormality is otherwise identified. 3. Small bilateral pleural effusions. Possible mild component of interstitial pulmonary edema. Moderate cardiomegaly. 4. 21 mm left indeterminate adrenal nodule. Depending on comorbidities, follow-up dedicated CT adrenal glands is considered. 5. Old right superior and inferior pubic rami and ischial tuberosity fractures. Severe osteoarthritis of the hips. No acute osseous abnormalities are identified. MDM:    61-year-old female presenting after mechanical fall at nursing facility. Patient was walking with her walker and tripped and fell onto her right side. History of recent above. Vitals on presentation are reassuring and patient afebrile satting well on room air. Vitals monitored in the ED and remained stable. Overall patient is very well-appearing and patient has a baseline mental status for her. CT head and C-spine are nonacute. Patient had some very slight discomfort without rebound or guarding in her lower abdomen showing small amount of free fluid in the pelvis nonspecific but within the physiologic range with no acute abdominal pelvic abnormality otherwise identified. Small bilateral pleural effusions there is a old right superior and inferior pubic rami and ischial tuberosity fractures with severe osteoarthritis of the hips with no acute osseous abnormalities identified. On reevaluation patient has no tenderness palpation over the lower abdomen or pelvis. I did discuss with radiology who states that this fluid in the pelvis does not appear to be blood likely physiologic. X-ray of the hands show no acute osseous abnormality. Chest x-ray is nonacute other than small bilateral pleural effusions with adjacent atelectasis. Laboratory evaluation including CBC and CMP are overall reassuring. Family is at bedside on reevaluation. I did discuss with them the CT abdomen pelvis imaging.   I did offer observation overnight for serial abdominal exams and vital monitoring but discussion with family and shared decision making we are concerned about patient's dementia/confusion worsening in unfamiliar environment and would prefer the patient return to nursing facility and will return for any new change or worsening symptoms. This does seem reasonable at this time. I discussed strict return precautions as well as close follow-up and patient discharged home. Clinical Impression:  1. Fall from standing, initial encounter          Comment: Please note this report has been produced using speech recognition software and may contain errors related to that system including errors in grammar, punctuation, and spelling, as well as words and phrases that may be inappropriate. Efforts were made to edit the dictations.         Myra Bryan MD  09/03/22 7801

## 2022-10-30 ENCOUNTER — APPOINTMENT (OUTPATIENT)
Dept: CT IMAGING | Age: 87
End: 2022-10-30
Payer: MEDICARE

## 2022-10-30 ENCOUNTER — HOSPITAL ENCOUNTER (EMERGENCY)
Age: 87
Discharge: HOME | End: 2022-10-30
Payer: MEDICARE

## 2022-10-30 ENCOUNTER — APPOINTMENT (OUTPATIENT)
Dept: GENERAL RADIOLOGY | Age: 87
End: 2022-10-30
Payer: MEDICARE

## 2022-10-30 ENCOUNTER — APPOINTMENT (OUTPATIENT)
Dept: ULTRASOUND IMAGING | Age: 87
End: 2022-10-30
Payer: MEDICARE

## 2022-10-30 VITALS
DIASTOLIC BLOOD PRESSURE: 73 MMHG | TEMPERATURE: 97.6 F | RESPIRATION RATE: 18 BRPM | HEART RATE: 65 BPM | OXYGEN SATURATION: 99 % | SYSTOLIC BLOOD PRESSURE: 158 MMHG

## 2022-10-30 DIAGNOSIS — I50.9 ACUTE ON CHRONIC CONGESTIVE HEART FAILURE, UNSPECIFIED HEART FAILURE TYPE (HCC): Primary | ICD-10-CM

## 2022-10-30 LAB
A/G RATIO: 1.4 (ref 1.1–2.2)
ALBUMIN SERPL-MCNC: 3.4 G/DL (ref 3.4–5)
ALP BLD-CCNC: 170 U/L (ref 40–129)
ALT SERPL-CCNC: 80 U/L (ref 10–40)
ANION GAP SERPL CALCULATED.3IONS-SCNC: 9 MMOL/L (ref 3–16)
AST SERPL-CCNC: 49 U/L (ref 15–37)
BACTERIA: ABNORMAL /HPF
BASOPHILS ABSOLUTE: 0 K/UL (ref 0–0.2)
BASOPHILS RELATIVE PERCENT: 0.2 %
BILIRUB SERPL-MCNC: 1.8 MG/DL (ref 0–1)
BILIRUBIN URINE: ABNORMAL
BLOOD, URINE: NEGATIVE
BUN BLDV-MCNC: 24 MG/DL (ref 7–20)
CALCIUM SERPL-MCNC: 8.8 MG/DL (ref 8.3–10.6)
CHLORIDE BLD-SCNC: 96 MMOL/L (ref 99–110)
CLARITY: CLEAR
CO2: 29 MMOL/L (ref 21–32)
COLOR: YELLOW
CREAT SERPL-MCNC: 0.8 MG/DL (ref 0.6–1.2)
EOSINOPHILS ABSOLUTE: 0.1 K/UL (ref 0–0.6)
EOSINOPHILS RELATIVE PERCENT: 1.1 %
EPITHELIAL CELLS, UA: ABNORMAL /HPF (ref 0–5)
GFR SERPL CREATININE-BSD FRML MDRD: >60 ML/MIN/{1.73_M2}
GLUCOSE BLD-MCNC: 101 MG/DL (ref 70–99)
GLUCOSE URINE: NEGATIVE MG/DL
HCT VFR BLD CALC: 43.3 % (ref 36–48)
HEMOGLOBIN: 14.3 G/DL (ref 12–16)
KETONES, URINE: NEGATIVE MG/DL
LEUKOCYTE ESTERASE, URINE: NEGATIVE
LIPASE: 26 U/L (ref 13–60)
LYMPHOCYTES ABSOLUTE: 0.7 K/UL (ref 1–5.1)
LYMPHOCYTES RELATIVE PERCENT: 10 %
MCH RBC QN AUTO: 32.3 PG (ref 26–34)
MCHC RBC AUTO-ENTMCNC: 33.1 G/DL (ref 31–36)
MCV RBC AUTO: 97.5 FL (ref 80–100)
MICROSCOPIC EXAMINATION: YES
MONOCYTES ABSOLUTE: 0.4 K/UL (ref 0–1.3)
MONOCYTES RELATIVE PERCENT: 5.1 %
NEUTROPHILS ABSOLUTE: 6.1 K/UL (ref 1.7–7.7)
NEUTROPHILS RELATIVE PERCENT: 83.6 %
NITRITE, URINE: NEGATIVE
PDW BLD-RTO: 14.6 % (ref 12.4–15.4)
PH UA: 6 (ref 5–8)
PLATELET # BLD: 206 K/UL (ref 135–450)
PMV BLD AUTO: 8 FL (ref 5–10.5)
POTASSIUM SERPL-SCNC: 3.6 MMOL/L (ref 3.5–5.1)
PRO-BNP: ABNORMAL PG/ML (ref 0–449)
PROTEIN UA: 100 MG/DL
RBC # BLD: 4.44 M/UL (ref 4–5.2)
RBC UA: ABNORMAL /HPF (ref 0–4)
SODIUM BLD-SCNC: 134 MMOL/L (ref 136–145)
SPECIFIC GRAVITY UA: 1.02 (ref 1–1.03)
TOTAL PROTEIN: 5.9 G/DL (ref 6.4–8.2)
TROPONIN: 0.24 NG/ML
URINE TYPE: ABNORMAL
UROBILINOGEN, URINE: 1 E.U./DL
WBC # BLD: 7.3 K/UL (ref 4–11)
WBC UA: ABNORMAL /HPF (ref 0–5)

## 2022-10-30 PROCEDURE — 71045 X-RAY EXAM CHEST 1 VIEW: CPT

## 2022-10-30 PROCEDURE — 6370000000 HC RX 637 (ALT 250 FOR IP): Performed by: PHYSICIAN ASSISTANT

## 2022-10-30 PROCEDURE — 76705 ECHO EXAM OF ABDOMEN: CPT

## 2022-10-30 PROCEDURE — 93005 ELECTROCARDIOGRAM TRACING: CPT | Performed by: PHYSICIAN ASSISTANT

## 2022-10-30 PROCEDURE — 84484 ASSAY OF TROPONIN QUANT: CPT

## 2022-10-30 PROCEDURE — 83690 ASSAY OF LIPASE: CPT

## 2022-10-30 PROCEDURE — 99285 EMERGENCY DEPT VISIT HI MDM: CPT

## 2022-10-30 PROCEDURE — 6360000004 HC RX CONTRAST MEDICATION: Performed by: PHYSICIAN ASSISTANT

## 2022-10-30 PROCEDURE — 85025 COMPLETE CBC W/AUTO DIFF WBC: CPT

## 2022-10-30 PROCEDURE — 74177 CT ABD & PELVIS W/CONTRAST: CPT

## 2022-10-30 PROCEDURE — 83880 ASSAY OF NATRIURETIC PEPTIDE: CPT

## 2022-10-30 PROCEDURE — 80053 COMPREHEN METABOLIC PANEL: CPT

## 2022-10-30 PROCEDURE — 81001 URINALYSIS AUTO W/SCOPE: CPT

## 2022-10-30 RX ORDER — FUROSEMIDE 20 MG/1
20 TABLET ORAL ONCE
Status: COMPLETED | OUTPATIENT
Start: 2022-10-30 | End: 2022-10-30

## 2022-10-30 RX ORDER — FUROSEMIDE 20 MG/1
20 TABLET ORAL DAILY
Qty: 10 TABLET | Refills: 0 | Status: SHIPPED | OUTPATIENT
Start: 2022-10-30 | End: 2022-11-09

## 2022-10-30 RX ORDER — ASPIRIN 81 MG/1
81 TABLET, CHEWABLE ORAL ONCE
Status: COMPLETED | OUTPATIENT
Start: 2022-10-30 | End: 2022-10-30

## 2022-10-30 RX ADMIN — FUROSEMIDE 20 MG: 20 TABLET ORAL at 17:11

## 2022-10-30 RX ADMIN — ASPIRIN 81 MG 81 MG: 81 TABLET ORAL at 17:11

## 2022-10-30 RX ADMIN — IOPAMIDOL 75 ML: 755 INJECTION, SOLUTION INTRAVENOUS at 15:09

## 2022-10-30 ASSESSMENT — PAIN - FUNCTIONAL ASSESSMENT: PAIN_FUNCTIONAL_ASSESSMENT: NONE - DENIES PAIN

## 2022-10-30 NOTE — ED PROVIDER NOTES
[facility] Emergency Department    CHIEF COMPLAINT  Altered Mental Status (Pt sent in for altered mental status, pt does look at you when you say her name. She is \"singing\" pt does stop when I asked \"is anyone hurting you\" she looked at me and states \"no \"   resent covid //)      SHARED SERVICE VISIT  Evaluated by NITA. My supervising physician was available for consultation. EKG interpretation provided by attending physician. HISTORY OF PRESENT ILLNESS  Hector Fox is a 80 y.o. female who presents to the ED complaining of \"altered mental status\". Patient sent and by squad from local nursing home. Daughter did present during work-up who are ports that she was informed by nursing staff patient appeared to be moaning and uncomfortable overnight. States that this was abnormal for her although she does appear to be mentating at baseline. Daughter feels as if sometimes she is moaning and at other times singing. Patient unable to contribute to history. No other complaints, modifying factors or associated symptoms. Nursing notes reviewed. Past Medical History:   Diagnosis Date    COVID     Dementia ([address]     HTN (hypertension)     Hyperlipidemia      Past Surgical History:   Procedure Laterality Date    HYSTERECTOMY (CERVIX STATUS UNKNOWN)       No family history on file. Social History     Socioeconomic History    Marital status:       Spouse name: Not on file    Number of children: Not on file    Years of education: Not on file    Highest education level: Not on file   Occupational History    Not on file   Tobacco Use    Smoking status: Never    Smokeless tobacco: Never   Substance and Sexual Activity    Alcohol use: Not Currently    Drug use: Never    Sexual activity: Not on file   Other Topics Concern    Not on file   Social History Narrative    Not on file     Social Determinants of Health     Financial Resource Strain: Not on file   Food Insecurity: Not on file   Transportation Needs: Not on file   Physical Activity: Not on file   Stress: Not on file   Social Connections: Not on file   Intimate Partner Violence: Not on file   Housing Stability: Not on file     No current facility-administered medications for this encounter. Current Outpatient Medications   Medication Sig Dispense Refill    dimethicone-zinc oxide (KAREN PROTECT) cream Apply topically 2 times daily as needed for Dry Skin Apply topically 2 times daily as needed. Multiple Vitamins-Minerals (THERAPEUTIC MULTIVITAMIN-MINERALS) tablet Take 1 tablet by mouth daily      acetaminophen (TYLENOL) 325 mg tablet Take 650 mg by mouth every 4 hours as needed for Pain      Cholecalciferol (VITAMIN D3) 50 MCG (2000 UT) CAPS Take 2,000 Units by mouth daily      loperamide (IMODIUM) 2 MG capsule Take 2 mg by mouth 4 times daily as needed for Diarrhea      magnesium hydroxide (MILK OF MAGNESIA) 400 MG/5ML suspension Take 30 mLs by mouth daily as needed for Constipation      ondansetron (ZOFRAN) 4 MG tablet Take 4 mg by mouth every 6 hours as needed for Nausea or Vomiting      polyethylene glycol (GLYCOLAX) 17 g packet Take 17 g by mouth daily      metoprolol succinate (TOPROL XL) 100 MG extended release tablet Take 100 mg by mouth daily       No Known Allergies    REVIEW OF SYSTEMS  10 systems reviewed, pertinent positives per HPI otherwise noted to be negative    PHYSICAL EXAM  BP (!) 158/73   Pulse 65   Temp 97.6 °F (36.4 °C) (Oral)   Resp 18   SpO2 99%   GENERAL APPEARANCE: Awake and alert. Cooperative. No acute distress. HEAD: Normocephalic. Atraumatic. EYES: PERRL. EOM's grossly intact. ENT: Mucous membranes are moist.   NECK: Supple. No meningismus. No JVD. HEART: RRR. No murmurs. No chest wall tenderness. LUNGS: Respirations unlabored. CTAB. Good air exchange. Speaking comfortably in full sentences. ABDOMEN: Soft. Non-distended.  Feel that I am eliciting some right upper quadrant tenderness with palpation. Negative McBurney's and Rovsing's. No fluids or ascites. No hernias or masses. Bowel sounds normal in all quadrants. No CVA tenderness. guarding or rebound. No masses. No organomegaly. EXTREMITIES: No peripheral edema. Moves all extremities equally. All extremities neurovascularly intact. SKIN: Warm and dry. No acute rashes. NEUROLOGICAL: Alert and oriented. CN's 2-12 intact. No gross facial drooping. Strength 5/5, sensation intact. PSYCHIATRIC: Normal mood and affect. RADIOLOGY  CT ABDOMEN PELVIS W IV CONTRAST Additional Contrast? None    Result Date: 10/30/2022  EXAMINATION: CT OF THE ABDOMEN AND PELVIS WITH CONTRAST 10/30/2022 2:57 pm TECHNIQUE: CT of the abdomen and pelvis was performed with the administration of intravenous contrast. Multiplanar reformatted images are provided for review. Automated exposure control, iterative reconstruction, and/or weight based adjustment of the mA/kV was utilized to reduce the radiation dose to as low as reasonably achievable. COMPARISON: 08/27/2022 HISTORY: ORDERING SYSTEM PROVIDED HISTORY: abd pain TECHNOLOGIST PROVIDED HISTORY: Reason for exam:->abd pain Additional Contrast?->None Decision Support Exception - unselect if not a suspected or confirmed emergency medical condition->Emergency Medical Condition (MA) Reason for Exam: abd pain, AMS, recent COVID Relevant Medical/Surgical History: hysterectomy FINDINGS: Lower Chest: Pacemaker leads are seen. Small bilateral pleural effusions. Cardiomegaly. Partial consolidation of the lower lobes adjacent to effusions. Organs: Abdomen and pelvis was imaged in the arterial phase, in addition, there is a paucity of intra-abdominal fat. 2 subcentimeter low-density hepatic lesions are grossly similar to prior. Gallbladder is distended, extending to the lower abdomen and pelvis. Calcified granulomas within the spleen. Stomach is decompressed.   Heterogeneous thickening of the left adrenal gland is nonspecific though not significantly changed as compared to prior. Both kidneys enhance. Left parapelvic cysts again seen. Calcification of the abdominal aorta and iliac arteries. GI/Bowel: Bowel evaluation is limited without oral contrast and given paucity of fat. Moderate stool within the colon. Appendix is not visualized. Small bowel is not dilated. Pelvis: Small amount of free pelvic fluid, Hounsfield units 18. Bladder is grossly unremarkable. Status post hysterectomy. No inguinal adenopathy. Peritoneum/Retroperitoneum: No free air. Bones/Soft Tissues: Old fractures of the right superior and inferior pubic rami as well as fragmentation of the left ischium posteriorly. Osteopenia. Degenerative change of the spine. Deformity of the proximal sacrum is similar to prior, compatible with remote fracture. Small bilateral pleural effusions and partial consolidation of the lower lobes, likely atelectasis in the absence of clinical signs or symptoms of pneumonia. Gallbladder is distended. If patient has symptoms referable, right upper quadrant ultrasound could be considered. Small amount of simple density free pelvic fluid. Moderate stool within the colon. Masslike thickening of the left adrenal gland, not significantly changed as compared to prior. US GALLBLADDER RUQ    Result Date: 10/30/2022  EXAMINATION: RIGHT UPPER QUADRANT ULTRASOUND 10/30/2022 3:39 pm COMPARISON: CT 10/30/2022 HISTORY: ORDERING SYSTEM PROVIDED HISTORY: abd pain TECHNOLOGIST PROVIDED HISTORY: Reason for exam:->abd pain FINDINGS: LIVER:  The liver is slightly increased in echogenicity. No focal hepatic lesion. BILIARY SYSTEM:  Gallbladder is unremarkable without evidence of pericholecystic fluid, wall thickening or stones. Negative sonographic Milner's sign. Common bile duct is within normal limits measuring 1 mm. RIGHT KIDNEY: The right kidney is grossly unremarkable without evidence of hydronephrosis.  PANCREAS:  Visualized portions of the pancreas are unremarkable. OTHER: No evidence of right upper quadrant ascites. Incidentally noted is a right pleural effusion. 1. Mild hepatic steatosis. Otherwise unremarkable right upper quadrant ultrasound. 2. Right pleural effusion. XR CHEST PORTABLE    Result Date: 10/30/2022  EXAMINATION: ONE XRAY VIEW OF THE CHEST 10/30/2022 11:52 am COMPARISON: 08/27/2022 HISTORY: ORDERING SYSTEM PROVIDED HISTORY: cough TECHNOLOGIST PROVIDED HISTORY: Reason for exam:->cough Initial encounter FINDINGS: Diffuse osteopenia. Cardiac pacer stable in positioning. Stable cardiomegaly. Mild interstitial edema. Small bilateral pleural effusions with adjacent atelectasis. Surgical clips in the right axilla. No new focal consolidation. The     Mild interstitial edema small bilateral pleural effusions. ED COURSE  pain control was not required while here in the emergency department. Triage vitals stable. CBC without leukocytosis or anemia. CMP with mild transaminitis. Lipase normal.  Urinalysis without evidence to suggest infectious process. Patient with a troponin of 0.24. BNP of approximately 30,000. EKG paced rhythm. Chest x-ray appears consistent with mild pulmonary edema and bilateral pleural effusions. CT abdomen and pelvis redemonstrating pleural effusions. CT abdomen and pelvis showing mild gallbladder distention as well. Right upper quadrant ultrasound negative for acute pathology. According to chart review I was unable to locate prior troponin or BNP although does appear patient has a history of CHF. Not currently on diuretics. Given DNR status felt that patient may just be having mild CHF exacerbation causing her bump in troponin and potentially altered mental status. Family was offered admission although daughter feels comfortable with discharge home on diuretics with close outpatient cardiology follow-up. She was given dose of aspirin and Lasix.   Have discussed return precautions with daughter who is in agreement and comfortable at discharge. A discussion was had with Ms. Abhijit Huddleston daughter regarding her altered mental status, ED findings and recommendations for admission and/or subsequent follow-up. Risk management discussed and shared decision making had with patient and/or surrogate. All questions were answered. Patient will follow up with PCP and cardiology within 2 to 3 days for further evaluation/treatment. All questions answered. Patient will return to ED for new/worsening symptoms. Patient was sent home with a prescription for Lasix and informed to continue home medications as prescribed.     MDM  Results for orders placed or performed during the hospital encounter of 10/30/22   CBC with Auto Differential   Result Value Ref Range    WBC 7.3 4.0 - 11.0 K/uL    RBC 4.44 4.00 - 5.20 M/uL    Hemoglobin 14.3 12.0 - 16.0 g/dL    Hematocrit 43.3 36.0 - 48.0 %    MCV 97.5 80.0 - 100.0 fL    MCH 32.3 26.0 - 34.0 pg    MCHC 33.1 31.0 - 36.0 g/dL    RDW 14.6 12.4 - 15.4 %    Platelets 392 456 - 484 K/uL    MPV 8.0 5.0 - 10.5 fL    Neutrophils % 83.6 %    Lymphocytes % 10.0 %    Monocytes % 5.1 %    Eosinophils % 1.1 %    Basophils % 0.2 %    Neutrophils Absolute 6.1 1.7 - 7.7 K/uL    Lymphocytes Absolute 0.7 (L) 1.0 - 5.1 K/uL    Monocytes Absolute 0.4 0.0 - 1.3 K/uL    Eosinophils Absolute 0.1 0.0 - 0.6 K/uL    Basophils Absolute 0.0 0.0 - 0.2 K/uL   Comprehensive Metabolic Panel   Result Value Ref Range    Sodium 134 (L) 136 - 145 mmol/L    Potassium 3.6 3.5 - 5.1 mmol/L    Chloride 96 (L) 99 - 110 mmol/L    CO2 29 21 - 32 mmol/L    Anion Gap 9 3 - 16    Glucose 101 (H) 70 - 99 mg/dL    BUN 24 (H) 7 - 20 mg/dL    Creatinine 0.8 0.6 - 1.2 mg/dL    Est, Glom Filt Rate >60 >60    Calcium 8.8 8.3 - 10.6 mg/dL    Total Protein 5.9 (L) 6.4 - 8.2 g/dL    Albumin 3.4 3.4 - 5.0 g/dL    Albumin/Globulin Ratio 1.4 1.1 - 2.2    Total Bilirubin 1.8 (H) 0.0 - 1.0 mg/dL    Alkaline Phosphatase 170 (H) 40 - 129 U/L    ALT 80 (H) 10 - 40 U/L    AST 49 (H) 15 - 37 U/L   Urinalysis   Result Value Ref Range    Color, UA Yellow Straw/Yellow    Clarity, UA Clear Clear    Glucose, Ur Negative Negative mg/dL    Bilirubin Urine SMALL (A) Negative    Ketones, Urine Negative Negative mg/dL    Specific Gravity, UA 1.025 1.005 - 1.030    Blood, Urine Negative Negative    pH, UA 6.0 5.0 - 8.0    Protein,  (A) Negative mg/dL    Urobilinogen, Urine 1.0 <2.0 E.U./dL    Nitrite, Urine Negative Negative    Leukocyte Esterase, Urine Negative Negative    Microscopic Examination YES     Urine Type Catheter    Troponin   Result Value Ref Range    Troponin 0.24 (H) <0.01 ng/mL   Microscopic Urinalysis   Result Value Ref Range    WBC, UA 0-2 0 - 5 /HPF    RBC, UA 0-2 0 - 4 /HPF    Epithelial Cells, UA 6-10 (A) 0 - 5 /HPF    Bacteria, UA 3+ (A) None Seen /HPF   Brain Natriuretic Peptide   Result Value Ref Range    Pro-BNP 30,238 (H) 0 - 449 pg/mL   Lipase   Result Value Ref Range    Lipase 26.0 13.0 - 60.0 U/L   EKG 12 Lead   Result Value Ref Range    Ventricular Rate 73 BPM    Atrial Rate 86 BPM    P-R Interval 176 ms    QRS Duration 166 ms    Q-T Interval 516 ms    QTc Calculation (Bazett) 568 ms    R Axis -64 degrees    T Axis 107 degrees    Diagnosis       Electronic ventricular pacemakerNo previous ECGs available     I estimate there is LOW risk for ACUTE CORONARY SYNDROME, INTRACRANIAL HEMORRHAGE, MALIGNANT DYSRHYTHMIA, MENINGITIS, PNEUMONIA, PULMONARY EMBOLISM, SEPSIS, SUBARACHNOID HEMORRHAGE, SUBDURAL HEMATOMA, STROKE, or URINARY TRACT INFECTION, thus I consider the discharge disposition reasonable. Louise Madison and I have discussed the diagnosis and risks, and we agree with discharging home to follow-up with their primary doctor. We also discussed returning to the Emergency Department immediately if new or worsening symptoms occur.  We have discussed the symptoms which are most concerning (e.g., changing or worsening pain, weakness, vomiting, fever) that necessitate immediate return. Final Impression  1. Acute on chronic congestive heart failure, unspecified heart failure type (HCC)      Blood pressure (!) 158/73, pulse 65, temperature 97.6 °F (36.4 °C), temperature source Oral, resp. rate 18, SpO2 99 %. DISPOSITION  Patient was discharged to home in good condition.          Yomaira Das, [id number]  10/30/22 0990

## 2022-10-30 NOTE — ED PROVIDER NOTES
I have reviewed the below EKG. I was not otherwise involved in this patient's care. EKG  The Ekg interpreted by myself  normal pacemaker rhythm with a rate of 73  No prior EKG for comparison.      Parish áVsquez MD  10/30/22 0396

## 2022-10-31 LAB
EKG ATRIAL RATE: 86 BPM
EKG DIAGNOSIS: NORMAL
EKG P-R INTERVAL: 176 MS
EKG Q-T INTERVAL: 516 MS
EKG QRS DURATION: 166 MS
EKG QTC CALCULATION (BAZETT): 568 MS
EKG R AXIS: -64 DEGREES
EKG T AXIS: 107 DEGREES
EKG VENTRICULAR RATE: 73 BPM

## 2022-10-31 PROCEDURE — 93010 ELECTROCARDIOGRAM REPORT: CPT | Performed by: INTERNAL MEDICINE
